# Patient Record
Sex: MALE | Race: OTHER | HISPANIC OR LATINO | URBAN - METROPOLITAN AREA
[De-identification: names, ages, dates, MRNs, and addresses within clinical notes are randomized per-mention and may not be internally consistent; named-entity substitution may affect disease eponyms.]

---

## 2019-12-17 ENCOUNTER — OUTPATIENT (OUTPATIENT)
Dept: OUTPATIENT SERVICES | Facility: HOSPITAL | Age: 59
LOS: 1 days | Discharge: HOME | End: 2019-12-17

## 2019-12-18 DIAGNOSIS — G47.33 OBSTRUCTIVE SLEEP APNEA (ADULT) (PEDIATRIC): ICD-10-CM

## 2020-09-26 ENCOUNTER — APPOINTMENT (OUTPATIENT)
Dept: DISASTER EMERGENCY | Facility: CLINIC | Age: 60
End: 2020-09-26

## 2020-09-26 PROBLEM — Z00.00 ENCOUNTER FOR PREVENTIVE HEALTH EXAMINATION: Status: ACTIVE | Noted: 2020-09-26

## 2020-09-27 LAB — SARS-COV-2 N GENE NPH QL NAA+PROBE: NOT DETECTED

## 2020-09-29 VITALS
OXYGEN SATURATION: 95 % | HEART RATE: 53 BPM | TEMPERATURE: 99 F | HEIGHT: 72 IN | DIASTOLIC BLOOD PRESSURE: 70 MMHG | RESPIRATION RATE: 18 BRPM | WEIGHT: 315 LBS | SYSTOLIC BLOOD PRESSURE: 150 MMHG

## 2020-09-29 RX ORDER — CHLORHEXIDINE GLUCONATE 213 G/1000ML
1 SOLUTION TOPICAL ONCE
Refills: 0 | Status: DISCONTINUED | OUTPATIENT
Start: 2020-09-30 | End: 2020-09-30

## 2020-09-29 NOTE — H&P ADULT - NSHPLABSRESULTS_GEN_ALL_CORE
12.1   4.85  )-----------( 318      ( 30 Sep 2020 11:11 )             36.5         PT/INR - ( 30 Sep 2020 11:11 )   PT: 15.9 sec;   INR: 1.34          PTT - ( 30 Sep 2020 11:11 )  PTT:42.0 sec          EKG: SB @ 51 with 1st degree AV block without significant ST/T segment changes

## 2020-09-29 NOTE — H&P ADULT - HISTORY OF PRESENT ILLNESS
*Meds reviewed, needs refill on Statin, has not taken Lipitor 10 mg as he has not had a refill    Cardiologist: Dr. Weber  Pharmacy: [facility] in [place] [address]  Escort: wife, Sade BETANCOURT: NEGATIVE, results in [facility]     Pt is a 60 y/o male with FHx of CAD, PMHx of HLD, newly diagnosed atrial fibrillation, (On Xarelto 20 mg last dose 09/29/2020 - Dr. Zeynep musa), presented to cardiologist Dr. Weber after being told he was in atrial fibrillation during his company physical back in 06/2020. Patient returned to Dr. Weber for follow-up where he underwent an NST that was abnormal. Patient denies any cardiac symptoms and reports feeling well for the past few months. Patient denies CP, SOB, palpitations, dizziness, diaphoresis, LE edema, orthopnea, fatigue, n/v, syncope, recent travel, or fatigue. NST 08/29/2020: LV global function normal. LV volume moderately enlarged. LV wall motion abnormal. Akinesis in the proximal inferoseptal segment. Hypokinesis in the proximal to distal inferior and septal segments. EF: 63%.  In light of patient's risk factors and abnormal NST, patient presents for left heart cardiac catheterization with possible intervention.       *Meds reviewed, needs refill on Statin, has not taken Lipitor 10 mg as he has not had a refill    Cardiologist: Dr. Weber  Pharmacy: [facility] in [place] [address]  Escort: wife, Sade BETANCOURT: NEGATIVE, results in [facility]     58 y/o male with FHx of CAD, PMHx of HLD, newly diagnosed atrial fibrillation, (On Xarelto 20 mg last dose 09/29/2020 - Dr. Adhikari aware), presented to Cardiologist Dr. Weber after being told he was in atrial fibrillation during his company physical back in 06/2020. Patient returned to Dr. Weber for follow-up where he underwent an NST that was abnormal. Patient denies any cardiac symptoms and reports feeling well for the past few months. Patient denies CP, SOB, palpitations, dizziness, diaphoresis, LE edema, orthopnea, fatigue, n/v, syncope, recent travel, or fatigue. NST 08/29/2020: LV global function normal. LV volume moderately enlarged. LV wall motion abnormal. Akinesis in the proximal inferoseptal segment. Hypokinesis in the proximal to distal inferior and septal segments. EF: 63%. In light of patient's risk factors, abnormal NST, recently dx AF, patient referred cardiac cath with possible intervention to r/o underlying CAD.      *Meds reviewed, needs refill on Statin, has not taken Lipitor 10 mg as he has not had a refill    Cardiologist: Dr. Weber  Pharmacy: [facility] in [place] [address]  Escort: wife, Sade BETANCOURT: NEGATIVE, results in [facility]     60 y/o male with FHx of CAD, PMHx of HLD, newly diagnosed atrial fibrillation, (On Xarelto 20 mg last dose 09/29/2020 - Dr. Adhikari aware), RAZIA (on CPAP at night) who presented to Cardiologist Dr. Weber after being told he was in atrial fibrillation during his company physical back in 06/2020. Patient returned to Dr. Weber for follow-up where he underwent an NST that was abnormal. Patient denies any cardiac symptoms and reports feeling well for the past few months. Patient denies CP, SOB, palpitations, dizziness, diaphoresis, LE edema, orthopnea, fatigue, n/v, syncope, recent travel, or fatigue. NST 08/29/2020: LV global function normal. LV volume moderately enlarged. LV wall motion abnormal. Akinesis in the proximal inferoseptal segment. Hypokinesis in the proximal to distal inferior and septal segments. EF: 63%. In light of patient's risk factors, abnormal NST, recently dx AF, patient referred cardiac cath with possible intervention to r/o underlying CAD.

## 2020-09-29 NOTE — H&P ADULT - ASSESSMENT
- Patient took Xarelto 20 mg on 09/29/2020 - Dr. Adhikari aware - OK   - Patient has not been taking his Atorvastatin 10 mg once daily as he ran out of refills  60 y/o male with FHx of CAD, PMHx of HLD, newly diagnosed atrial fibrillation, (On Xarelto 20 mg last dose 09/29/2020 - Dr. Zeynep musa), presented to Cardiologist Dr. Weber after being told he was in atrial fibrillation during his company physical back in 06/2020. Patient returned to Dr. Weber for follow-up where he underwent an NST that was abnormal. Patient denies any cardiac symptoms and reports feeling well for the past few months. Patient denies CP, SOB, palpitations, dizziness, diaphoresis, LE edema, orthopnea, fatigue, n/v, syncope, recent travel, or fatigue. NST 08/29/2020: LV global function normal. LV volume moderately enlarged. LV wall motion abnormal. Akinesis in the proximal inferoseptal segment. Hypokinesis in the proximal to distal inferior and septal segments. EF: 63%. In light of patient's risk factors, abnormal NST, recently dx AF, patient referred cardiac cath with possible intervention to r/o underlying CAD.     - Patient took Xarelto 20 mg on 09/29/2020 - Dr. Zeynep musa - OK   - Patient has not been taking his Atorvastatin 10 mg once daily as he ran out of refills  58 y/o male with FHx of CAD, PMHx of HLD, newly diagnosed atrial fibrillation, (On Xarelto 20 mg last dose 09/29/2020 - Dr. Adhikari aware), RAZIA (on CPAP at night) who presented to Cardiologist Dr. Weber after being told he was in atrial fibrillation during his company physical back in 06/2020. Patient returned to Dr. Weber for follow-up where he underwent an NST that was abnormal. Patient denies any cardiac symptoms and reports feeling well for the past few months. Patient denies CP, SOB, palpitations, dizziness, diaphoresis, LE edema, orthopnea, fatigue, n/v, syncope, recent travel, or fatigue. NST 08/29/2020: LV global function normal. LV volume moderately enlarged. LV wall motion abnormal. Akinesis in the proximal inferoseptal segment. Hypokinesis in the proximal to distal inferior and septal segments. EF: 63%. In light of patient's risk factors, abnormal NST, recently dx AF, patient referred cardiac cath with possible intervention to r/o underlying CAD.     Patient took Xarelto 20 mg on 09/29/2020 AM- Dr. Adhikari made aware and OK to proceed.     Patient has not been taking his Atorvastatin 10 mg once daily as he ran out of refills, needs refills upon discharge    h/o RAZIA and sleeps with CPAP, if he stays, need to order this    IV NS @ 75 cc/hr pre-cath fluids    Does not take Ecotrin/Plavix. Will load with Ecotrin 325 mg PO x1 and Plavix 600 mg PO x1 pre-caht.     ASA Class III    Mallampati Class III    Risks & benefits of procedure and alternative therapy have been explained to the patient including but not limited to: allergic reaction, bleeding with possible need for blood transfusion, infection, renal and vascular compromise, limb damage, arrhythmia, stroke, vessel dissection/perforation, Myocardial infarction, emergent CABG. Informed consent obtained and in chart.       Patient a candidate for sedation: Yes    Sedation Type: Moderate

## 2020-09-30 ENCOUNTER — OUTPATIENT (OUTPATIENT)
Dept: OUTPATIENT SERVICES | Facility: HOSPITAL | Age: 60
LOS: 1 days | Discharge: NURSING, REHAB OR LONG-TERM CARE | End: 2020-09-30
Payer: COMMERCIAL

## 2020-09-30 LAB
A1C WITH ESTIMATED AVERAGE GLUCOSE RESULT: 6.1 % — HIGH (ref 4–5.6)
ALBUMIN SERPL ELPH-MCNC: 4.6 G/DL — SIGNIFICANT CHANGE UP (ref 3.3–5)
ALP SERPL-CCNC: 55 U/L — SIGNIFICANT CHANGE UP (ref 40–120)
ALT FLD-CCNC: 41 U/L — SIGNIFICANT CHANGE UP (ref 10–45)
ANION GAP SERPL CALC-SCNC: 12 MMOL/L — SIGNIFICANT CHANGE UP (ref 5–17)
APTT BLD: 42 SEC — HIGH (ref 27.5–35.5)
AST SERPL-CCNC: 27 U/L — SIGNIFICANT CHANGE UP (ref 10–40)
BASOPHILS # BLD AUTO: 0.02 K/UL — SIGNIFICANT CHANGE UP (ref 0–0.2)
BASOPHILS NFR BLD AUTO: 0.4 % — SIGNIFICANT CHANGE UP (ref 0–2)
BILIRUB SERPL-MCNC: 0.4 MG/DL — SIGNIFICANT CHANGE UP (ref 0.2–1.2)
BUN SERPL-MCNC: 13 MG/DL — SIGNIFICANT CHANGE UP (ref 7–23)
CALCIUM SERPL-MCNC: 9.7 MG/DL — SIGNIFICANT CHANGE UP (ref 8.4–10.5)
CHLORIDE SERPL-SCNC: 104 MMOL/L — SIGNIFICANT CHANGE UP (ref 96–108)
CHOLEST SERPL-MCNC: 164 MG/DL — SIGNIFICANT CHANGE UP (ref 10–199)
CK MB CFR SERPL CALC: 1.7 NG/ML — SIGNIFICANT CHANGE UP (ref 0–6.7)
CK SERPL-CCNC: 235 U/L — HIGH (ref 30–200)
CO2 SERPL-SCNC: 24 MMOL/L — SIGNIFICANT CHANGE UP (ref 22–31)
CREAT SERPL-MCNC: 0.89 MG/DL — SIGNIFICANT CHANGE UP (ref 0.5–1.3)
EOSINOPHIL # BLD AUTO: 0.1 K/UL — SIGNIFICANT CHANGE UP (ref 0–0.5)
EOSINOPHIL NFR BLD AUTO: 2.1 % — SIGNIFICANT CHANGE UP (ref 0–6)
ESTIMATED AVERAGE GLUCOSE: 128 MG/DL — HIGH (ref 68–114)
GLUCOSE SERPL-MCNC: 94 MG/DL — SIGNIFICANT CHANGE UP (ref 70–99)
HCT VFR BLD CALC: 36.5 % — LOW (ref 39–50)
HDLC SERPL-MCNC: 43 MG/DL — SIGNIFICANT CHANGE UP
HGB BLD-MCNC: 12.1 G/DL — LOW (ref 13–17)
IMM GRANULOCYTES NFR BLD AUTO: 0.2 % — SIGNIFICANT CHANGE UP (ref 0–1.5)
INR BLD: 1.34 — HIGH (ref 0.88–1.16)
LIPID PNL WITH DIRECT LDL SERPL: 95 MG/DL — SIGNIFICANT CHANGE UP
LYMPHOCYTES # BLD AUTO: 1.34 K/UL — SIGNIFICANT CHANGE UP (ref 1–3.3)
LYMPHOCYTES # BLD AUTO: 27.6 % — SIGNIFICANT CHANGE UP (ref 13–44)
MCHC RBC-ENTMCNC: 32.4 PG — SIGNIFICANT CHANGE UP (ref 27–34)
MCHC RBC-ENTMCNC: 33.2 GM/DL — SIGNIFICANT CHANGE UP (ref 32–36)
MCV RBC AUTO: 97.9 FL — SIGNIFICANT CHANGE UP (ref 80–100)
MONOCYTES # BLD AUTO: 0.43 K/UL — SIGNIFICANT CHANGE UP (ref 0–0.9)
MONOCYTES NFR BLD AUTO: 8.9 % — SIGNIFICANT CHANGE UP (ref 2–14)
NEUTROPHILS # BLD AUTO: 2.95 K/UL — SIGNIFICANT CHANGE UP (ref 1.8–7.4)
NEUTROPHILS NFR BLD AUTO: 60.8 % — SIGNIFICANT CHANGE UP (ref 43–77)
NRBC # BLD: 0 /100 WBCS — SIGNIFICANT CHANGE UP (ref 0–0)
PLATELET # BLD AUTO: 318 K/UL — SIGNIFICANT CHANGE UP (ref 150–400)
POTASSIUM SERPL-MCNC: 4.1 MMOL/L — SIGNIFICANT CHANGE UP (ref 3.5–5.3)
POTASSIUM SERPL-SCNC: 4.1 MMOL/L — SIGNIFICANT CHANGE UP (ref 3.5–5.3)
PROT SERPL-MCNC: 7.5 G/DL — SIGNIFICANT CHANGE UP (ref 6–8.3)
PROTHROM AB SERPL-ACNC: 15.9 SEC — HIGH (ref 10.6–13.6)
RBC # BLD: 3.73 M/UL — LOW (ref 4.2–5.8)
RBC # FLD: 11.9 % — SIGNIFICANT CHANGE UP (ref 10.3–14.5)
SODIUM SERPL-SCNC: 140 MMOL/L — SIGNIFICANT CHANGE UP (ref 135–145)
TOTAL CHOLESTEROL/HDL RATIO MEASUREMENT: 3.8 RATIO — SIGNIFICANT CHANGE UP (ref 3.4–9.6)
TRIGL SERPL-MCNC: 128 MG/DL — SIGNIFICANT CHANGE UP (ref 10–149)
WBC # BLD: 4.85 K/UL — SIGNIFICANT CHANGE UP (ref 3.8–10.5)
WBC # FLD AUTO: 4.85 K/UL — SIGNIFICANT CHANGE UP (ref 3.8–10.5)

## 2020-09-30 PROCEDURE — 85730 THROMBOPLASTIN TIME PARTIAL: CPT

## 2020-09-30 PROCEDURE — 93458 L HRT ARTERY/VENTRICLE ANGIO: CPT | Mod: 26

## 2020-09-30 PROCEDURE — 93458 L HRT ARTERY/VENTRICLE ANGIO: CPT

## 2020-09-30 PROCEDURE — 93005 ELECTROCARDIOGRAM TRACING: CPT

## 2020-09-30 PROCEDURE — 80053 COMPREHEN METABOLIC PANEL: CPT

## 2020-09-30 PROCEDURE — C1887: CPT

## 2020-09-30 PROCEDURE — 85610 PROTHROMBIN TIME: CPT

## 2020-09-30 PROCEDURE — 82553 CREATINE MB FRACTION: CPT

## 2020-09-30 PROCEDURE — 83036 HEMOGLOBIN GLYCOSYLATED A1C: CPT

## 2020-09-30 PROCEDURE — 93010 ELECTROCARDIOGRAM REPORT: CPT

## 2020-09-30 PROCEDURE — 82550 ASSAY OF CK (CPK): CPT

## 2020-09-30 PROCEDURE — C1894: CPT

## 2020-09-30 PROCEDURE — 80061 LIPID PANEL: CPT

## 2020-09-30 PROCEDURE — 85025 COMPLETE CBC W/AUTO DIFF WBC: CPT

## 2020-09-30 PROCEDURE — 36415 COLL VENOUS BLD VENIPUNCTURE: CPT

## 2020-09-30 PROCEDURE — C1769: CPT

## 2020-09-30 RX ORDER — ASPIRIN/CALCIUM CARB/MAGNESIUM 324 MG
325 TABLET ORAL ONCE
Refills: 0 | Status: COMPLETED | OUTPATIENT
Start: 2020-09-30 | End: 2020-09-30

## 2020-09-30 RX ORDER — ATORVASTATIN CALCIUM 80 MG/1
1 TABLET, FILM COATED ORAL
Qty: 30 | Refills: 0
Start: 2020-09-30 | End: 2020-10-29

## 2020-09-30 RX ORDER — SODIUM CHLORIDE 9 MG/ML
500 INJECTION INTRAMUSCULAR; INTRAVENOUS; SUBCUTANEOUS
Refills: 0 | Status: DISCONTINUED | OUTPATIENT
Start: 2020-09-30 | End: 2020-09-30

## 2020-09-30 RX ORDER — ATORVASTATIN CALCIUM 80 MG/1
1 TABLET, FILM COATED ORAL
Qty: 0 | Refills: 0 | DISCHARGE

## 2020-09-30 RX ORDER — CLOPIDOGREL BISULFATE 75 MG/1
600 TABLET, FILM COATED ORAL ONCE
Refills: 0 | Status: COMPLETED | OUTPATIENT
Start: 2020-09-30 | End: 2020-09-30

## 2020-09-30 RX ADMIN — CLOPIDOGREL BISULFATE 600 MILLIGRAM(S): 75 TABLET, FILM COATED ORAL at 12:09

## 2020-09-30 RX ADMIN — SODIUM CHLORIDE 75 MILLILITER(S): 9 INJECTION INTRAMUSCULAR; INTRAVENOUS; SUBCUTANEOUS at 12:09

## 2020-09-30 RX ADMIN — Medication 325 MILLIGRAM(S): at 12:09

## 2020-09-30 NOTE — PROGRESS NOTE ADULT - SUBJECTIVE AND OBJECTIVE BOX
Interventional Cardiology PA SDA Discharge Note    Patient without complaints. Ambulated and voided without difficulties    Afebrile, VSS    Ext: Right Radial: No hematoma, no bleeding, dressing; C/D/I      Pulses: 2+  intact RAD to baseline     A/P:  58 y/o male with FHx of CAD, PMHx of HLD, newly diagnosed atrial fibrillation, (On Xarelto 20 mg last dose 09/29/2020 - Dr. Adhikari aware), RAZIA (on CPAP at night) who presented to Cardiologist Dr. Weber after being told he was in atrial fibrillation during his company physical back in 06/2020. Patient returned to Dr. Weber for follow-up where he underwent an NST that was abnormal. Patient denies any cardiac symptoms and reports feeling well for the past few months. Patient denies CP, SOB, palpitations, dizziness, diaphoresis, LE edema, orthopnea, fatigue, n/v, syncope, recent travel, or fatigue. NST 08/29/2020: LV global function normal. LV volume moderately enlarged. LV wall motion abnormal. Akinesis in the proximal inferoseptal segment. Hypokinesis in the proximal to distal inferior and septal segments. EF: 63%. In light of patient's risk factors, abnormal NST, recently dx AF, patient referred cardiac cath with possible intervention to r/o underlying CAD.   Pt is now s/p dx cardiac cath on 9/30/20:      1.	Stable for discharge as per attending Dr. Adhikari after bed rest, pt voids, wrist stable and 30 minutes of ambulation.  2.	Follow-up with PMD/Cardiologist Dr. Weber in 1-2 weeks  3.	Discharged forms signed and copies in chart    Interventional Cardiology PA SDA Discharge Note    Patient without complaints. Ambulated and voided without difficulties    Afebrile, VSS    Ext: Right Radial: No hematoma, no bleeding, dressing; C/D/I      Pulses: 2+  intact RAD to baseline     A/P:  58 y/o male with FHx of CAD, PMHx of HLD, newly diagnosed atrial fibrillation, (On Xarelto 20 mg last dose 09/29/2020 - Dr. Adhikari aware), RAZIA (on CPAP at night) who presented to Cardiologist Dr. Weber after being told he was in atrial fibrillation during his company physical back in 06/2020. Patient returned to Dr. Weber for follow-up where he underwent an NST that was abnormal. Patient denies any cardiac symptoms and reports feeling well for the past few months. Patient denies CP, SOB, palpitations, dizziness, diaphoresis, LE edema, orthopnea, fatigue, n/v, syncope, recent travel, or fatigue. NST 08/29/2020: LV global function normal. LV volume moderately enlarged. LV wall motion abnormal. Akinesis in the proximal inferoseptal segment. Hypokinesis in the proximal to distal inferior and septal segments. EF: 63%. In light of patient's risk factors, abnormal NST, recently dx AF, patient referred cardiac cath with possible intervention to r/o underlying CAD.   Pt is now s/p dx cardiac cath on 9/30/20: normal coronaries, no AS/MR, EDP 18.    1.	Stable for discharge as per attending Dr. Adhikari after bed rest, pt voids, wrist stable and 30 minutes of ambulation.  2.	Follow-up with PMD/Cardiologist Dr. Weber in 1-2 weeks  3.	Discharged forms signed and copies in chart

## 2020-12-30 PROBLEM — Z86.79 PERSONAL HISTORY OF OTHER DISEASES OF THE CIRCULATORY SYSTEM: Chronic | Status: ACTIVE | Noted: 2020-09-29

## 2020-12-30 PROBLEM — E78.5 HYPERLIPIDEMIA, UNSPECIFIED: Chronic | Status: ACTIVE | Noted: 2020-09-29

## 2021-04-09 ENCOUNTER — APPOINTMENT (OUTPATIENT)
Dept: CARDIOLOGY | Facility: CLINIC | Age: 61
End: 2021-04-09
Payer: COMMERCIAL

## 2021-04-09 VITALS
TEMPERATURE: 97 F | BODY MASS INDEX: 42.66 KG/M2 | DIASTOLIC BLOOD PRESSURE: 78 MMHG | WEIGHT: 315 LBS | SYSTOLIC BLOOD PRESSURE: 146 MMHG | HEIGHT: 72 IN | HEART RATE: 50 BPM

## 2021-04-09 DIAGNOSIS — Z78.9 OTHER SPECIFIED HEALTH STATUS: ICD-10-CM

## 2021-04-09 PROCEDURE — 93000 ELECTROCARDIOGRAM COMPLETE: CPT

## 2021-04-09 PROCEDURE — 99072 ADDL SUPL MATRL&STAF TM PHE: CPT

## 2021-04-09 PROCEDURE — 99204 OFFICE O/P NEW MOD 45 MIN: CPT

## 2021-04-09 NOTE — HISTORY OF PRESENT ILLNESS
[FreeTextEntry1] : The patient has history of RAZIA . He had Atrial fibrillation . Needed DCCV . Had work up which included a cardiac cath which showed mild lumen irregularities . The patient uses Bipap every night . No SOB or chest pain . Echo from 7-20 showed EF 45-50% . He has been stable on Metoprolol

## 2021-04-09 NOTE — PHYSICAL EXAM
[General Appearance - Well Developed] : well developed [Normal Appearance] : normal appearance [Well Groomed] : well groomed [General Appearance - Well Nourished] : well nourished [No Deformities] : no deformities [General Appearance - In No Acute Distress] : no acute distress [Normal Conjunctiva] : the conjunctiva exhibited no abnormalities [Eyelids - No Xanthelasma] : the eyelids demonstrated no xanthelasmas [Normal Oral Mucosa] : normal oral mucosa [No Oral Pallor] : no oral pallor [No Oral Cyanosis] : no oral cyanosis [Normal Jugular Venous A Waves Present] : normal jugular venous A waves present [Normal Jugular Venous V Waves Present] : normal jugular venous V waves present [No Jugular Venous Prado A Waves] : no jugular venous prado A waves [Normal Rate] : normal [Rhythm Regular] : regular [No Murmur] : no murmurs heard [2+] : left 2+ [No Pitting Edema] : no pitting edema present [Respiration, Rhythm And Depth] : normal respiratory rhythm and effort [Exaggerated Use Of Accessory Muscles For Inspiration] : no accessory muscle use [Auscultation Breath Sounds / Voice Sounds] : lungs were clear to auscultation bilaterally [Abdomen Soft] : soft [Abdomen Tenderness] : non-tender [Abdomen Mass (___ Cm)] : no abdominal mass palpated [Abnormal Walk] : normal gait [Nail Clubbing] : no clubbing of the fingernails [Cyanosis, Localized] : no localized cyanosis [Petechial Hemorrhages (___cm)] : no petechial hemorrhages [Skin Color & Pigmentation] : normal skin color and pigmentation [] : no rash [No Venous Stasis] : no venous stasis [Skin Lesions] : no skin lesions [No Skin Ulcers] : no skin ulcer [No Xanthoma] : no  xanthoma was observed [FreeTextEntry1] : No Bruit

## 2021-04-09 NOTE — ASSESSMENT
[FreeTextEntry1] : The patient had PAF last year and had an extensive work up done by cardiologist in [place] .He had an EF of 45-50% but had no CAD ( possibly from AF )  . He had PAF and had DCCV . No known history of HTN and he remains CAHDSvasc 0 and is now off of DOAC . The patient has no CP or SOB and has remained in NSR . The patient has had no recurrence of AF . He  is obese and has RAZIA . From the cardiac point of view he can continue to work as long as the physicians at [facility] approve of his medication and his treatment  of RAZIA

## 2021-04-14 ENCOUNTER — APPOINTMENT (OUTPATIENT)
Dept: CARDIOLOGY | Facility: CLINIC | Age: 61
End: 2021-04-14

## 2021-04-28 ENCOUNTER — APPOINTMENT (OUTPATIENT)
Dept: CARDIOLOGY | Facility: CLINIC | Age: 61
End: 2021-04-28
Payer: COMMERCIAL

## 2021-04-28 PROCEDURE — 99072 ADDL SUPL MATRL&STAF TM PHE: CPT

## 2021-04-28 PROCEDURE — 93306 TTE W/DOPPLER COMPLETE: CPT

## 2021-06-30 ENCOUNTER — APPOINTMENT (OUTPATIENT)
Dept: PULMONOLOGY | Facility: CLINIC | Age: 61
End: 2021-06-30
Payer: COMMERCIAL

## 2021-06-30 VITALS
HEART RATE: 80 BPM | RESPIRATION RATE: 14 BRPM | SYSTOLIC BLOOD PRESSURE: 126 MMHG | WEIGHT: 315 LBS | DIASTOLIC BLOOD PRESSURE: 78 MMHG | OXYGEN SATURATION: 98 % | BODY MASS INDEX: 42.66 KG/M2 | HEIGHT: 72 IN

## 2021-06-30 PROCEDURE — 99213 OFFICE O/P EST LOW 20 MIN: CPT

## 2021-06-30 NOTE — PROCEDURE
[FreeTextEntry1] : RAZIA COMPLIANT AND BENEFITING, SPOKE REGARDING RECALL WELL AWARE\par WEIGHT LOSS\par CARDIO NOTE REVIEWED

## 2021-08-25 ENCOUNTER — APPOINTMENT (OUTPATIENT)
Dept: CARDIOLOGY | Facility: CLINIC | Age: 61
End: 2021-08-25
Payer: COMMERCIAL

## 2021-08-25 VITALS
DIASTOLIC BLOOD PRESSURE: 88 MMHG | SYSTOLIC BLOOD PRESSURE: 126 MMHG | TEMPERATURE: 97.3 F | BODY MASS INDEX: 42.66 KG/M2 | HEART RATE: 76 BPM | WEIGHT: 315 LBS | HEIGHT: 72 IN

## 2021-08-25 VITALS — DIASTOLIC BLOOD PRESSURE: 70 MMHG | SYSTOLIC BLOOD PRESSURE: 104 MMHG

## 2021-08-25 PROCEDURE — 93000 ELECTROCARDIOGRAM COMPLETE: CPT

## 2021-08-25 PROCEDURE — 99214 OFFICE O/P EST MOD 30 MIN: CPT

## 2021-08-25 NOTE — CARDIOLOGY SUMMARY
[___] : [unfilled] [de-identified] : 8- AF controlled VR .  [de-identified] : 4- Normal V systolic functio mild MR mild TR .

## 2021-08-25 NOTE — ASSESSMENT
[FreeTextEntry1] : The patient has PAF and is back in AF. The patient has had no chest pain , No SOB  He has RAZIA and is obese . He is using BIPAP and has seen pulmonary . He is CHADSvasc 0 . Repeat echo done in April shows normal LV systolic function which is improved .

## 2021-08-25 NOTE — HISTORY OF PRESENT ILLNESS
[FreeTextEntry1] : The patient has been feeling well. Noted again to be in AF . The patient had a repeat echo here which showed normal LV systolic function .LA enlargement was mild by dimension and increased LA volume index .

## 2021-08-25 NOTE — REASON FOR VISIT
[Arrhythmia/ECG Abnorrmalities] : arrhythmia/ECG abnormalities [Consultation] : a consultation regarding [Atrial Fibrillation] : atrial fibrillation [Cardiomyopathy] : cardiomyopathy [FreeTextEntry3] : Dr. De Luna

## 2021-09-02 ENCOUNTER — APPOINTMENT (OUTPATIENT)
Dept: CARDIOLOGY | Facility: CLINIC | Age: 61
End: 2021-09-02
Payer: COMMERCIAL

## 2021-09-02 VITALS
HEIGHT: 72 IN | SYSTOLIC BLOOD PRESSURE: 124 MMHG | WEIGHT: 315 LBS | HEART RATE: 65 BPM | BODY MASS INDEX: 42.66 KG/M2 | OXYGEN SATURATION: 95 % | RESPIRATION RATE: 16 BRPM | DIASTOLIC BLOOD PRESSURE: 74 MMHG | TEMPERATURE: 98 F

## 2021-09-02 PROCEDURE — 99205 OFFICE O/P NEW HI 60 MIN: CPT

## 2021-09-02 PROCEDURE — 93000 ELECTROCARDIOGRAM COMPLETE: CPT

## 2021-09-03 NOTE — PHYSICAL EXAM
[Well Developed] : well developed [Well Nourished] : well nourished [No Acute Distress] : no acute distress [Normal Conjunctiva] : normal conjunctiva [Normal Venous Pressure] : normal venous pressure [No Carotid Bruit] : no carotid bruit [No Murmur] : no murmur [No Rub] : no rub [No Gallop] : no gallop [Irregularly Irregular] : irregularly irregular [Clear Lung Fields] : clear lung fields [Good Air Entry] : good air entry [No Respiratory Distress] : no respiratory distress  [Soft] : abdomen soft [Non Tender] : non-tender [No Masses/organomegaly] : no masses/organomegaly [Normal Bowel Sounds] : normal bowel sounds [Normal Gait] : normal gait [No Edema] : no edema [No Cyanosis] : no cyanosis [No Clubbing] : no clubbing [No Varicosities] : no varicosities [No Rash] : no rash [No Skin Lesions] : no skin lesions [Moves all extremities] : moves all extremities [No Focal Deficits] : no focal deficits [Normal Speech] : normal speech [Alert and Oriented] : alert and oriented [Normal memory] : normal memory

## 2021-09-05 NOTE — ASSESSMENT
[FreeTextEntry1] : ## Persistent AF s/p DCCV\par ## Non-ischemic CM\par \par - CHADSVASC: 0. Not on OAC. Discussed pros-cons. After discussing pros-cons, we will hold off on OAC for now. To be started on Xarelto after AF ablation (see below).\par - Discussed management options including clinical observation, rate control, rhythm control with AAD/DCCV vs ablation. Considering prior dilated CM, they opted for rhythm control- with ablation, which is reasonable over AAD considering his young age.\par - We discussed multiple therapeutic options for the treatment of atrial fibrillation, including undergoing an atrial fibrillation/left atrial antral isolation ablation. The details of the procedure and risks associated with undergoing an atrial fibrillation/SHANTANU ablation were discussed in detail including, but not limited to, death, myocardial ischemia, stroke, cardiac perforation, pulmonary vein stenosis, diaphragmatic paralysis via phrenic nerve injury, catheter entrapment in the mitral valve or other location, bleeding, infection, deep vein thrombosis, vascular injury, and worsening atrial arrhythmias. We also discussed that there is a low risk of possible esophageal ulceration, atrial esophageal fistula, atrial bronchial fistula, or another complication requiring the need for major surgery to address.\par \par We also discussed that recurrent atrial fibrillation in the first 2-3 months post procedure is a part of the healing process and has no impact on the overall longer- term success of the ablation. To try to reduce the incidence of these events, the plan will be for antiarrhythmic therapy to be restarted post procedure and continued for the first 3 months after ablation. 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

## 2021-09-05 NOTE — HISTORY OF PRESENT ILLNESS
[FreeTextEntry1] : I had a pleasure of seeing Mr. WEBER for initial consultation for Atrial Fibrillation.  He is accompanied by his wife. he works at [employer] as a . \par \par Mr. WEBER is a 60 year-year old male with history of obesity, persistent atrial fibrillation s/p DCCV (07/21), Non-ischemic CM with improvement of EF after DCCV, RAZIA on CPAP- compliant is found to have AF again.\par \par ? Fatigue\par Denies chest pain, shortness of breath, palpitation, dizziness or LOC except noted above.\par \par EKG: AF @ 67\par TTE (04/21): Nl EF, Mild LAE\par LHC (10/20): Mild irreg\par Cardio: Dr. Wesley

## 2021-09-08 ENCOUNTER — TRANSCRIPTION ENCOUNTER (OUTPATIENT)
Age: 61
End: 2021-09-08

## 2021-09-13 ENCOUNTER — OUTPATIENT (OUTPATIENT)
Dept: OUTPATIENT SERVICES | Facility: HOSPITAL | Age: 61
LOS: 1 days | Discharge: HOME | End: 2021-09-13
Payer: COMMERCIAL

## 2021-09-13 VITALS
HEIGHT: 73 IN | TEMPERATURE: 98 F | HEART RATE: 66 BPM | RESPIRATION RATE: 14 BRPM | SYSTOLIC BLOOD PRESSURE: 136 MMHG | OXYGEN SATURATION: 97 % | WEIGHT: 315 LBS | DIASTOLIC BLOOD PRESSURE: 78 MMHG

## 2021-09-13 DIAGNOSIS — Z01.818 ENCOUNTER FOR OTHER PREPROCEDURAL EXAMINATION: ICD-10-CM

## 2021-09-13 DIAGNOSIS — I48.0 PAROXYSMAL ATRIAL FIBRILLATION: ICD-10-CM

## 2021-09-13 LAB
ALBUMIN SERPL ELPH-MCNC: 4.6 G/DL — SIGNIFICANT CHANGE UP (ref 3.5–5.2)
ALP SERPL-CCNC: 56 U/L — SIGNIFICANT CHANGE UP (ref 30–115)
ALT FLD-CCNC: 23 U/L — SIGNIFICANT CHANGE UP (ref 0–41)
ANION GAP SERPL CALC-SCNC: 12 MMOL/L — SIGNIFICANT CHANGE UP (ref 7–14)
APTT BLD: 38.3 SEC — SIGNIFICANT CHANGE UP (ref 27–39.2)
AST SERPL-CCNC: 21 U/L — SIGNIFICANT CHANGE UP (ref 0–41)
BASOPHILS # BLD AUTO: 0.02 K/UL — SIGNIFICANT CHANGE UP (ref 0–0.2)
BASOPHILS NFR BLD AUTO: 0.3 % — SIGNIFICANT CHANGE UP (ref 0–1)
BILIRUB SERPL-MCNC: 0.3 MG/DL — SIGNIFICANT CHANGE UP (ref 0.2–1.2)
BUN SERPL-MCNC: 17 MG/DL — SIGNIFICANT CHANGE UP (ref 10–20)
CALCIUM SERPL-MCNC: 9.2 MG/DL — SIGNIFICANT CHANGE UP (ref 8.5–10.1)
CHLORIDE SERPL-SCNC: 104 MMOL/L — SIGNIFICANT CHANGE UP (ref 98–110)
CO2 SERPL-SCNC: 23 MMOL/L — SIGNIFICANT CHANGE UP (ref 17–32)
CREAT SERPL-MCNC: 0.9 MG/DL — SIGNIFICANT CHANGE UP (ref 0.7–1.5)
EOSINOPHIL # BLD AUTO: 0.12 K/UL — SIGNIFICANT CHANGE UP (ref 0–0.7)
EOSINOPHIL NFR BLD AUTO: 1.8 % — SIGNIFICANT CHANGE UP (ref 0–8)
GLUCOSE SERPL-MCNC: 88 MG/DL — SIGNIFICANT CHANGE UP (ref 70–99)
HCT VFR BLD CALC: 37.5 % — LOW (ref 42–52)
HGB BLD-MCNC: 12.7 G/DL — LOW (ref 14–18)
IMM GRANULOCYTES NFR BLD AUTO: 0.3 % — SIGNIFICANT CHANGE UP (ref 0.1–0.3)
INR BLD: 1.07 RATIO — SIGNIFICANT CHANGE UP (ref 0.65–1.3)
LYMPHOCYTES # BLD AUTO: 2.18 K/UL — SIGNIFICANT CHANGE UP (ref 1.2–3.4)
LYMPHOCYTES # BLD AUTO: 33.1 % — SIGNIFICANT CHANGE UP (ref 20.5–51.1)
MCHC RBC-ENTMCNC: 32.6 PG — HIGH (ref 27–31)
MCHC RBC-ENTMCNC: 33.9 G/DL — SIGNIFICANT CHANGE UP (ref 32–37)
MCV RBC AUTO: 96.4 FL — HIGH (ref 80–94)
MONOCYTES # BLD AUTO: 0.66 K/UL — HIGH (ref 0.1–0.6)
MONOCYTES NFR BLD AUTO: 10 % — HIGH (ref 1.7–9.3)
NEUTROPHILS # BLD AUTO: 3.58 K/UL — SIGNIFICANT CHANGE UP (ref 1.4–6.5)
NEUTROPHILS NFR BLD AUTO: 54.5 % — SIGNIFICANT CHANGE UP (ref 42.2–75.2)
NRBC # BLD: 0 /100 WBCS — SIGNIFICANT CHANGE UP (ref 0–0)
PLATELET # BLD AUTO: 388 K/UL — SIGNIFICANT CHANGE UP (ref 130–400)
POTASSIUM SERPL-MCNC: 4.1 MMOL/L — SIGNIFICANT CHANGE UP (ref 3.5–5)
POTASSIUM SERPL-SCNC: 4.1 MMOL/L — SIGNIFICANT CHANGE UP (ref 3.5–5)
PROT SERPL-MCNC: 7.5 G/DL — SIGNIFICANT CHANGE UP (ref 6–8)
PROTHROM AB SERPL-ACNC: 12.3 SEC — SIGNIFICANT CHANGE UP (ref 9.95–12.87)
RBC # BLD: 3.89 M/UL — LOW (ref 4.7–6.1)
RBC # FLD: 12.1 % — SIGNIFICANT CHANGE UP (ref 11.5–14.5)
SODIUM SERPL-SCNC: 139 MMOL/L — SIGNIFICANT CHANGE UP (ref 135–146)
WBC # BLD: 6.58 K/UL — SIGNIFICANT CHANGE UP (ref 4.8–10.8)
WBC # FLD AUTO: 6.58 K/UL — SIGNIFICANT CHANGE UP (ref 4.8–10.8)

## 2021-09-13 PROCEDURE — 93010 ELECTROCARDIOGRAM REPORT: CPT

## 2021-09-13 NOTE — H&P PST ADULT - NSICDXPASTMEDICALHX_GEN_ALL_CORE_FT
PAST MEDICAL HISTORY:  H/O hernia repair     History of atrial fibrillation     Hyperlipidemia     RAZIA treated with BiPAP     Severe obesity (BMI >= 40)     Torn meniscus RIGHT KNEE REPAIR

## 2021-09-13 NOTE — H&P PST ADULT - HISTORY OF PRESENT ILLNESS
PT PRESENTS TO PAST WITH NO SOB, CP, PALPITATIONS, DYSURIA, UTI OR URI AT PRESENT.   PT ABLE TO WALK UP 2-3 FLIGHTS OF STEPS WITH NO SOB.  AS PER THE PT, THIS IS HIS/HER COMPLETE MEDICAL AND SURGICAL HX, INCLUDING MEDICATIONS PRESCRIBED AND OVER THE COUNTER  pt denies any covid s/s, YES   tested positive in the past- 3/2020- PT AWARE HE IS SCHEDULED FOR COVID TESTING PRIOR TO PROCEDURE   pt advised self quarantine till day of procedure  denies travel outside the [place] in the past 30 days  Anesthesia Alert  NO--Difficult Airway  NO--History of neck surgery or radiation  NO--Limited ROM of neck  NO--History of Malignant hyperthermia  NO--Personal or family history of Pseudocholinesterase deficiency  NO--Prior Anesthesia Complication  NO--Latex Allergy  NO--Loose teeth  NO--History of Rheumatoid Arthritis  YES --RAZIA  NO BLEEDING RISK  NO--Other_____

## 2021-09-13 NOTE — H&P PST ADULT - REASON FOR ADMISSION
Patient is a   60 year old    male presenting to PAST in preparation for  JEROD/ A- FIB ABLATION /MAPPING/ EP STUDY A -FIB. on    9/27/21  under   GEN anesthesia by Dr. Swift .  Pt reports-- I have a h/o a fib. I have it for over a year.   Pt denies having any pain.

## 2021-09-14 ENCOUNTER — RESULT REVIEW (OUTPATIENT)
Age: 61
End: 2021-09-14

## 2021-09-14 PROCEDURE — 71046 X-RAY EXAM CHEST 2 VIEWS: CPT | Mod: 26

## 2021-09-24 ENCOUNTER — OUTPATIENT (OUTPATIENT)
Dept: OUTPATIENT SERVICES | Facility: HOSPITAL | Age: 61
LOS: 1 days | Discharge: HOME | End: 2021-09-24

## 2021-09-24 DIAGNOSIS — Z11.59 ENCOUNTER FOR SCREENING FOR OTHER VIRAL DISEASES: ICD-10-CM

## 2021-09-25 PROBLEM — G47.33 OBSTRUCTIVE SLEEP APNEA (ADULT) (PEDIATRIC): Chronic | Status: ACTIVE | Noted: 2021-09-13

## 2021-09-25 PROBLEM — E66.01 MORBID (SEVERE) OBESITY DUE TO EXCESS CALORIES: Chronic | Status: ACTIVE | Noted: 2021-09-13

## 2021-09-25 PROBLEM — Z98.890 OTHER SPECIFIED POSTPROCEDURAL STATES: Chronic | Status: ACTIVE | Noted: 2021-09-13

## 2021-09-25 PROBLEM — S83.209A UNSPECIFIED TEAR OF UNSPECIFIED MENISCUS, CURRENT INJURY, UNSPECIFIED KNEE, INITIAL ENCOUNTER: Chronic | Status: ACTIVE | Noted: 2021-09-13

## 2021-09-27 ENCOUNTER — INPATIENT (INPATIENT)
Facility: HOSPITAL | Age: 61
LOS: 0 days | Discharge: HOME | End: 2021-09-28
Attending: INTERNAL MEDICINE | Admitting: INTERNAL MEDICINE
Payer: COMMERCIAL

## 2021-09-27 VITALS — HEIGHT: 72.83 IN | WEIGHT: 315 LBS

## 2021-09-27 DIAGNOSIS — I48.0 PAROXYSMAL ATRIAL FIBRILLATION: ICD-10-CM

## 2021-09-27 PROCEDURE — 93325 DOPPLER ECHO COLOR FLOW MAPG: CPT | Mod: 26

## 2021-09-27 PROCEDURE — 93662 INTRACARDIAC ECG (ICE): CPT | Mod: 26

## 2021-09-27 PROCEDURE — 93656 COMPRE EP EVAL ABLTJ ATR FIB: CPT

## 2021-09-27 PROCEDURE — 76937 US GUIDE VASCULAR ACCESS: CPT | Mod: 26

## 2021-09-27 PROCEDURE — 93312 ECHO TRANSESOPHAGEAL: CPT | Mod: 26

## 2021-09-27 PROCEDURE — 93320 DOPPLER ECHO COMPLETE: CPT | Mod: 26

## 2021-09-27 PROCEDURE — 93613 INTRACARDIAC EPHYS 3D MAPG: CPT

## 2021-09-27 PROCEDURE — 93623 PRGRMD STIMJ&PACG IV RX NFS: CPT | Mod: 26

## 2021-09-27 RX ORDER — SODIUM CHLORIDE 9 MG/ML
1000 INJECTION, SOLUTION INTRAVENOUS
Refills: 0 | Status: DISCONTINUED | OUTPATIENT
Start: 2021-09-27 | End: 2021-09-27

## 2021-09-27 RX ORDER — METOPROLOL TARTRATE 50 MG
1 TABLET ORAL
Qty: 0 | Refills: 0 | DISCHARGE

## 2021-09-27 RX ORDER — RIVAROXABAN 15 MG-20MG
20 KIT ORAL DAILY
Refills: 0 | Status: DISCONTINUED | OUTPATIENT
Start: 2021-09-27 | End: 2021-09-27

## 2021-09-27 RX ORDER — APIXABAN 2.5 MG/1
5 TABLET, FILM COATED ORAL EVERY 12 HOURS
Refills: 0 | Status: DISCONTINUED | OUTPATIENT
Start: 2021-09-27 | End: 2021-09-28

## 2021-09-27 RX ORDER — ATORVASTATIN CALCIUM 80 MG/1
10 TABLET, FILM COATED ORAL DAILY
Refills: 0 | Status: DISCONTINUED | OUTPATIENT
Start: 2021-09-27 | End: 2021-09-28

## 2021-09-27 RX ORDER — METOPROLOL TARTRATE 50 MG
50 TABLET ORAL DAILY
Refills: 0 | Status: DISCONTINUED | OUTPATIENT
Start: 2021-09-27 | End: 2021-09-28

## 2021-09-27 RX ORDER — APIXABAN 2.5 MG/1
1 TABLET, FILM COATED ORAL
Qty: 60 | Refills: 2
Start: 2021-09-27 | End: 2021-12-25

## 2021-09-27 RX ORDER — METOPROLOL TARTRATE 50 MG
25 TABLET ORAL DAILY
Refills: 0 | Status: DISCONTINUED | OUTPATIENT
Start: 2021-09-27 | End: 2021-09-28

## 2021-09-27 RX ORDER — ACETAMINOPHEN 500 MG
650 TABLET ORAL ONCE
Refills: 0 | Status: DISCONTINUED | OUTPATIENT
Start: 2021-09-27 | End: 2021-09-28

## 2021-09-27 RX ORDER — OXYCODONE HYDROCHLORIDE 5 MG/1
5 TABLET ORAL ONCE
Refills: 0 | Status: DISCONTINUED | OUTPATIENT
Start: 2021-09-27 | End: 2021-09-28

## 2021-09-27 RX ORDER — RIVAROXABAN 15 MG-20MG
1 KIT ORAL
Qty: 0 | Refills: 0 | DISCHARGE

## 2021-09-27 RX ADMIN — APIXABAN 5 MILLIGRAM(S): 2.5 TABLET, FILM COATED ORAL at 18:49

## 2021-09-27 NOTE — CHART NOTE - NSCHARTNOTEFT_GEN_A_CORE
PACU ANESTHESIA ADMISSION NOTE      Procedure: afib ablation  Post op diagnosis:  afub    ____  Intubated  TV:______       Rate: ______      FiO2: ______    __x__  Patent Airway    __x__  Full return of protective reflexes    __x__  Full recovery from anesthesia / back to baseline status    Vitals:  T(C): --  HR: --  BP: --  RR: --  SpO2: --    Mental Status:  __x__ Awake   ___x__ Alert   _____ Drowsy   _____ Sedated    Nausea/Vomiting:  __x__ NO  ______Yes,   See Post - Op Orders          Pain Scale (0-10):  __0___    Treatment: ____ None    __x__ See Post - Op/PCA Orders    Post - Operative Fluids:   ____ Oral   __x__ See Post - Op Orders    Plan: Discharge:   ____Home       __x___Floor     _____Critical Care    _____  Other:_________________    Comments: Patient had smooth intraoperative event, no anesthesia complication.    PACU Vital signs: HR:     84       BP:   115     /     66     RR:      14       O2 Sat:  97     %     Temp 98.1
PROCEDURES:   •	AFIB Ablation by Pulmonary Vein Isolation (PVI)  •	EP study with CS catheter placement and pacing  •	Single transseptal puncture with LA entry  •	Intracardiac echocardiography  •	Ultrasound for venous access  •	Three-dimensional intracardiac electro-anatomic mapping   •	Acute drug testing/IV Drug-Isuprel Administration        •	External direct current cardioversion                                      BRIEF SUMMARY:  AF on presentation. Trace baseline pericardial effusion. 1st pass isolation PVI. DCCV to sinus. No recurrence with Isuprel and aggressive stimulation. Vascade in left groin, figure of 8 in right groin. Unchanged trivial effusion at the end.    : Kalia Veras MD, [facility], [facility]     INDICATION FOR PROCEDURE: Symptomatic early persistent atrial fibrillation intolerant/refractory to anti-arrhythmic medication     JEROD was done prior to the procedure showed left atrial enlargement with no left atrial thrombus. Please see separate report for detailed findings.     CONSENT: The risks, benefits and alternatives to the procedure have been described to the patient in detail. All questions were answered.  The patient expressed understanding and has agreed to proceed.      PRESENTING RHYTHM: Atrial Fibrillation    DETAILS OF PROCEDURE: The patient was brought to the EP Lab in a fasting state after informed and written consent was obtained. Cardiac rhythm, blood pressure, heart rate, respirations, oxygen saturation and level of consciousness were monitored prior to, throughout and after the procedure. General anesthesia was administered by trained staff members with the supervision of an anesthesiology attending. The patient was placed supine on the fluoroscopy table, prepped and draped in the usual sterile fashion.  Local anesthetic was provided. Ultrasound was utilized to confirm femoral venous patency. Needle access was obtained under ultrasound guidance and a permanent recording obtained.  Venous access was obtained using a micropuncture needle in the right/left femoral veins under fluoroscopic/anatomic and ultrasound guidance using the modified Seldinger technique.  Guidewires were placed through each venous access. One 8.5 French SL-1 sheaths in right femoral vein, and one 11 French and a 6 French short sheaths in left femoral vein.     INTRACARDIAC ECHO:  Intracardiac echocardiography was performed under the guidance of fluoroscopy from the mid right atrium. Cardiac anatomy was assessed. There was trace baseline pericardial effusion. The left atrial size was enlarged. No thrombus was seen.  Post-procedure, the intracardiac echocardiogram was repeated and there remained unchanged.    CATHETER PLACEMENT: The intracardiac echo catheter was advanced via the femoral vein to the mid-right atrium. A [employer]y catheter and the [employer] SmartTouch DF curve catheter were advanced via long sheaths into the left atrium after transseptal puncture.  A deflectable decapolar catheter was advanced in the CS.     SINGLE TRANSSEPTAL PUNCTURE WITH LA ENTRY: We used the RF transseptal needle under the guidance of fluoroscopic images and intracardiac echo. The intra-atrial septum was punctured and left atrial access obtained. Anticoagulation with Heparin IV bolus was immediately provided after puncture. We titrated the heparin drip per protocol to keep ACT greater than 350 throughout the left atrial ablation. ACT were checked every 15-20 minutes and adjusted per protocol. ICE imaging confirmed LA entry.     THREE DIMENSIONAL ELECTRO-ANATOMIC MAPPING: Using the mapping catheter and CARTO system, 3-D electroanatomic mapping of the left atrium and pulmonary veins were created.     PULMONARY VENOUS ISOLATION: Pulmonary vein potentials were identified at the antra of each vein. We performed a wide area circumferential ablation around the pulmonary veins to create electrical isolation.   All pulmonary venous antra were isolated successfully. Post ablation, pacing was done within and outside of the pulmonary vein to demonstrate conduction block both into and out of the vein.The total time from last ablation lesion to confirmation of pulmonary vein entrance block was about 30 minutes. After completion of ablation, an EP study was performed including left atrial pacing and recording.    An esophageal temperature probe was used to monitor temperatures on the posterior wall. The esophagus course was midline. We were prepared to come off RF immediately when the esophageal temperature probe increased more than 1.0-degree C or to a max temperature of 38.5.  If a significant temperature rise was seen, the location was marked separately on the 3D non-contact map and maximum temperature achieved was recorded.  We did not have to limit ablation lesions due to a rise in temperature. Resting and often going to other areas was performed when ablating near the esophagus to allow recovery of the esophagus between lesions. 20-25 duncan was used on the posterior wall of the left atrium and up to 30-35 duncan on the anterior wall. Points were marked with different colors on the 3D non-contact map based on time of treatment at each site. The anesthesia personnel in the room moved the esophageal temperature probe in line with the level of ablation when treating the posterior wall. The electrophysiology nurse, anesthesia staff and physician continuously monitored the temperature. During ablation, the impedance was closely monitored by both the physician and the electrophysiology staff. We came off ablation once the impedance decreased by 10% indicating a transmural burn.     Contact force was also monitored continuously during the ablation. An average of 10 grams of force (5-15 grams) were used on the posterior wall at 20-25 duncan for 12-15 seconds and no longer than 20 seconds. If the electrical signals did not decrease significantly, the power was increased to 25 duncan for an additional 5-10 seconds at each site.    Phrenic evaluation: Pacing was utilized with ablation anterior to the right sided pulmonary veins to confirm no phrenic capture at sites of ablation.     Dissociated Firing of the PVs: Did not occur.    Vagal Effect:  No marked response was present.    Atrial Scarring: The atrial scarring included the periantral regions.     INTRAATRIAL PACING: Entrance and exit block across all four PVs was confirmed by intra-atrial pacing and by pacing inside each vein.     EXTERNAL DC CARDIOVERSION:  was performed to convert to sinus after PVI.     DRUG INFUSION: Isoproterenol 20 mcg/min was initiated for 5 minutes. During this time, patient had no atrial fibrillation. There was no significant atrial ectopy.     RAPID PACING: Rapid atrial pacing x3 to 250 msec did not induce atrial fibrillation or atrial flutter. On Isuprel washout, rapid atrial pacing x3 to 250 msec did not induce atrial fibrillation or atrial flutter.     DIAGNOSTIC EP STUDY:   Intervals: Sinus,  msec, VT interval 127 msec, QRSd 120 msec,  msec.     Post ablation intracardiac echo showed no change in pericardial effusion or LA thrombus.   Sheaths were removed and 'figure-of-8' stitches was placed on right groin, and Vascade device was used for hemostasis on left groin accesses.  The patient tolerated the procedure well.         IMPRESSION:   Symptomatic paroxysmal Atrial Fibrillation status post successful pulmonary vein antral isolation   Cavo-tricuspid Isthmus Ablation  High dose isoproterenol infusion        PLAN:   - Will monitor patient overnight at the hospital.   - Check access site/sites in the morning. Sutures will be removed in am.  - Bedrest 4 hours.  - Start anticoagulation with Eliquis. 1st dose tonight after bedrest.  - Rate control: Home meds  - Rhythm control medication: None  - Protonix/PPI for 4 weeks  - IV diuresis and potassium replacement as needed.   - Outpatient follow up in 1 month

## 2021-09-28 ENCOUNTER — TRANSCRIPTION ENCOUNTER (OUTPATIENT)
Age: 61
End: 2021-09-28

## 2021-09-28 VITALS
DIASTOLIC BLOOD PRESSURE: 58 MMHG | TEMPERATURE: 98 F | RESPIRATION RATE: 18 BRPM | SYSTOLIC BLOOD PRESSURE: 123 MMHG | HEART RATE: 65 BPM

## 2021-09-28 LAB
ALBUMIN SERPL ELPH-MCNC: 4.6 G/DL — SIGNIFICANT CHANGE UP (ref 3.5–5.2)
ALP SERPL-CCNC: 50 U/L — SIGNIFICANT CHANGE UP (ref 30–115)
ALT FLD-CCNC: 27 U/L — SIGNIFICANT CHANGE UP (ref 0–41)
ANION GAP SERPL CALC-SCNC: 11 MMOL/L — SIGNIFICANT CHANGE UP (ref 7–14)
AST SERPL-CCNC: 29 U/L — SIGNIFICANT CHANGE UP (ref 0–41)
BASOPHILS # BLD AUTO: 0.01 K/UL — SIGNIFICANT CHANGE UP (ref 0–0.2)
BASOPHILS NFR BLD AUTO: 0.2 % — SIGNIFICANT CHANGE UP (ref 0–1)
BILIRUB SERPL-MCNC: 0.6 MG/DL — SIGNIFICANT CHANGE UP (ref 0.2–1.2)
BUN SERPL-MCNC: 17 MG/DL — SIGNIFICANT CHANGE UP (ref 10–20)
CALCIUM SERPL-MCNC: 9.5 MG/DL — SIGNIFICANT CHANGE UP (ref 8.5–10.1)
CHLORIDE SERPL-SCNC: 101 MMOL/L — SIGNIFICANT CHANGE UP (ref 98–110)
CO2 SERPL-SCNC: 26 MMOL/L — SIGNIFICANT CHANGE UP (ref 17–32)
COVID-19 SPIKE DOMAIN AB INTERP: POSITIVE
COVID-19 SPIKE DOMAIN ANTIBODY RESULT: >250 U/ML — HIGH
CREAT SERPL-MCNC: 1 MG/DL — SIGNIFICANT CHANGE UP (ref 0.7–1.5)
EOSINOPHIL # BLD AUTO: 0.04 K/UL — SIGNIFICANT CHANGE UP (ref 0–0.7)
EOSINOPHIL NFR BLD AUTO: 0.6 % — SIGNIFICANT CHANGE UP (ref 0–8)
GLUCOSE SERPL-MCNC: 113 MG/DL — HIGH (ref 70–99)
HCT VFR BLD CALC: 36.9 % — LOW (ref 42–52)
HGB BLD-MCNC: 12.2 G/DL — LOW (ref 14–18)
IMM GRANULOCYTES NFR BLD AUTO: 0.2 % — SIGNIFICANT CHANGE UP (ref 0.1–0.3)
LYMPHOCYTES # BLD AUTO: 1.31 K/UL — SIGNIFICANT CHANGE UP (ref 1.2–3.4)
LYMPHOCYTES # BLD AUTO: 21 % — SIGNIFICANT CHANGE UP (ref 20.5–51.1)
MAGNESIUM SERPL-MCNC: 2.3 MG/DL — SIGNIFICANT CHANGE UP (ref 1.8–2.4)
MCHC RBC-ENTMCNC: 32.9 PG — HIGH (ref 27–31)
MCHC RBC-ENTMCNC: 33.1 G/DL — SIGNIFICANT CHANGE UP (ref 32–37)
MCV RBC AUTO: 99.5 FL — HIGH (ref 80–94)
MONOCYTES # BLD AUTO: 0.66 K/UL — HIGH (ref 0.1–0.6)
MONOCYTES NFR BLD AUTO: 10.6 % — HIGH (ref 1.7–9.3)
NEUTROPHILS # BLD AUTO: 4.22 K/UL — SIGNIFICANT CHANGE UP (ref 1.4–6.5)
NEUTROPHILS NFR BLD AUTO: 67.4 % — SIGNIFICANT CHANGE UP (ref 42.2–75.2)
NRBC # BLD: 0 /100 WBCS — SIGNIFICANT CHANGE UP (ref 0–0)
PLATELET # BLD AUTO: 335 K/UL — SIGNIFICANT CHANGE UP (ref 130–400)
POTASSIUM SERPL-MCNC: 5.1 MMOL/L — HIGH (ref 3.5–5)
POTASSIUM SERPL-SCNC: 5.1 MMOL/L — HIGH (ref 3.5–5)
PROT SERPL-MCNC: 7.3 G/DL — SIGNIFICANT CHANGE UP (ref 6–8)
RBC # BLD: 3.71 M/UL — LOW (ref 4.7–6.1)
RBC # FLD: 12.3 % — SIGNIFICANT CHANGE UP (ref 11.5–14.5)
SARS-COV-2 IGG+IGM SERPL QL IA: >250 U/ML — HIGH
SARS-COV-2 IGG+IGM SERPL QL IA: POSITIVE
SODIUM SERPL-SCNC: 138 MMOL/L — SIGNIFICANT CHANGE UP (ref 135–146)
WBC # BLD: 6.25 K/UL — SIGNIFICANT CHANGE UP (ref 4.8–10.8)
WBC # FLD AUTO: 6.25 K/UL — SIGNIFICANT CHANGE UP (ref 4.8–10.8)

## 2021-09-28 PROCEDURE — 99239 HOSP IP/OBS DSCHRG MGMT >30: CPT

## 2021-09-28 PROCEDURE — 93010 ELECTROCARDIOGRAM REPORT: CPT

## 2021-09-28 RX ORDER — PANTOPRAZOLE SODIUM 20 MG/1
40 TABLET, DELAYED RELEASE ORAL
Refills: 0 | Status: DISCONTINUED | OUTPATIENT
Start: 2021-09-28 | End: 2021-09-28

## 2021-09-28 RX ORDER — PANTOPRAZOLE SODIUM 20 MG/1
1 TABLET, DELAYED RELEASE ORAL
Qty: 30 | Refills: 0
Start: 2021-09-28 | End: 2021-10-27

## 2021-09-28 RX ORDER — RIVAROXABAN 15 MG-20MG
1 KIT ORAL
Qty: 30 | Refills: 2
Start: 2021-09-28 | End: 2021-12-26

## 2021-09-28 RX ORDER — INFLUENZA VIRUS VACCINE 15; 15; 15; 15 UG/.5ML; UG/.5ML; UG/.5ML; UG/.5ML
0.5 SUSPENSION INTRAMUSCULAR ONCE
Refills: 0 | Status: DISCONTINUED | OUTPATIENT
Start: 2021-09-28 | End: 2021-09-28

## 2021-09-28 RX ORDER — APIXABAN 2.5 MG/1
1 TABLET, FILM COATED ORAL
Qty: 60 | Refills: 2
Start: 2021-09-28 | End: 2021-12-26

## 2021-09-28 RX ADMIN — APIXABAN 5 MILLIGRAM(S): 2.5 TABLET, FILM COATED ORAL at 05:17

## 2021-09-28 RX ADMIN — PANTOPRAZOLE SODIUM 40 MILLIGRAM(S): 20 TABLET, DELAYED RELEASE ORAL at 11:34

## 2021-09-28 RX ADMIN — Medication 50 MILLIGRAM(S): at 05:17

## 2021-09-28 RX ADMIN — ATORVASTATIN CALCIUM 10 MILLIGRAM(S): 80 TABLET, FILM COATED ORAL at 11:34

## 2021-09-28 NOTE — DISCHARGE NOTE PROVIDER - NSDCMRMEDTOKEN_GEN_ALL_CORE_FT
Lipitor 10 mg oral tablet: 1 tab(s) orally once a day  metoprolol succinate 50 mg oral capsule, extended release: 1 cap(s) orally once a day  Metoprolol Succinate ER 25 mg oral tablet, extended release: 1 tab(s) orally once a day  pantoprazole 40 mg oral delayed release tablet: 1 tab(s) orally once a day (before a meal) MDD:1  Xarelto 20 mg oral tablet: 1 tab(s) orally once a day MDD:1    Take with dinner

## 2021-09-28 NOTE — DISCHARGE NOTE NURSING/CASE MANAGEMENT/SOCIAL WORK - PATIENT PORTAL LINK FT
You can access the FollowMyHealth Patient Portal offered by [facility] by registering at the following website: [email or web address]. By joining [employer]’s FollowMyHealth portal, you will also be able to view your health information using other applications (apps) compatible with our system.

## 2021-09-28 NOTE — DISCHARGE NOTE PROVIDER - HOSPITAL COURSE
This is a 59 y/o male with PMH severe obesity, RAZIA (Bipap), HTN, HLD PAF (CHADSVASC 0) who presents for AF ablation, POD #1. No immediate complications noted.

## 2021-09-28 NOTE — PROGRESS NOTE ADULT - ASSESSMENT
EP: Dr. Veras    This is a 61 y/o male with PMH severe obesity, RAZIA (Bipap), HTN, HLD PAF (CHADSVASC 0) who presents for AF ablation, POD #1. No immediate complications noted.    Plan:  - On ELiquis 5 mg PO Q 12, insurance does not cover. Awaiting authorization for Xarelto  - Cont AC uninterrupted x 3 months, no elective procedure  - Cont Toprol 75 mg XL daily  - Lifestyle modification- weight loss, diet, BiPAP compliance  - EKG noted  - Ambulate after 11 AM    Discharge Instructions:  - Do NOT stop blood thinners unless discussed with doctor  - Cont Protonix 40 mg daily x 30 days  - No heavy lifting > 10 lbs, squatting, or exertional activities for 5-7days  - Can take a shower starting tomorrow  - No submerging in water for 1 week  - No driving for 5 days  - FU in office on 10/27 at 9:30 AM

## 2021-09-28 NOTE — DISCHARGE NOTE PROVIDER - PROVIDER TOKENS
PROVIDER:[TOKEN:[[id number]],SCHEDULEDAPPT:[10/27/2021],SCHEDULEDAPPTTIME:[09:30 AM],ESTABLISHEDPATIENT:[T]]

## 2021-09-28 NOTE — DISCHARGE NOTE PROVIDER - NSDCFUADDINST_GEN_ALL_CORE_FT
- Do NOT stop blood thinners unless discussed with doctor  - Cont Protonix 40 mg daily x 30 days  - No heavy lifting > 10 lbs, squatting, or exertional activities for 5-7days  - Can take a shower starting tomorrow  - No submerging in water for 1 week  - No driving for 5 days  - FU in office on 10/27 at 9:30 AM

## 2021-09-28 NOTE — PROGRESS NOTE ADULT - SUBJECTIVE AND OBJECTIVE BOX
INTERVAL HPI/OVERNIGHT EVENTS:  Pt is POD #1 AF ablation. No acute events overnight. In NSR, no tele events noted. Rt groin suture removed.  Patient denies fever, chills, dizziness, syncope, chest pain, palpitations, SOB, cough, abd pain, n/v/d/c, dysuria, hematuria or unusual rash.     MEDICATIONS  (STANDING):  apixaban 5 milliGRAM(s) Oral every 12 hours  atorvastatin Oral Tab/Cap - Peds 10 milliGRAM(s) Oral daily  influenza   Vaccine 0.5 milliLiter(s) IntraMuscular once  metoprolol succinate ER 50 milliGRAM(s) Oral daily  metoprolol succinate ER 25 milliGRAM(s) Oral daily    MEDICATIONS  (PRN):  acetaminophen   Tablet .. 650 milliGRAM(s) Oral once PRN Mild Pain (1 - 3)  oxyCODONE    IR 5 milliGRAM(s) Oral once PRN Moderate Pain (4 - 6)      Allergies  No Known Allergies          REVIEW OF SYSTEMS    [x] A ten-point review of systems was otherwise negative except as noted.  [ ] Due to altered mental status/intubation, subjective information were not able to be obtained from the patient. History was obtained, to the extent possible, from review of the chart and collateral sources of information.      Vital Signs Last 24 Hrs  T(C): 35.9 (28 Sep 2021 05:32), Max: 36.7 (27 Sep 2021 21:40)  T(F): 96.6 (28 Sep 2021 05:32), Max: 98 (27 Sep 2021 21:40)  HR: 65 (28 Sep 2021 05:32) (65 - 71)  BP: 115/60 (28 Sep 2021 05:32) (109/67 - 115/60)  BP(mean): --  RR: 18 (28 Sep 2021 05:32) (18 - 18)  SpO2: --    09-27-21 @ 07:01  -  09-28-21 @ 07:00  --------------------------------------------------------  IN: 100 mL / OUT: 0 mL / NET: 100 mL        Physical Exam  GENERAL: Well appearing, overweight male. In no apparent distress, well nourished, and hydrated.  HEART: Regular rate and rhythm; No murmurs, rubs, or gallops.  PULMONARY: Clear to auscultation and perfusion.  No rales, wheezing, or rhonchi bilaterally.  ABDOMEN: Soft, Nontender, Nondistended; Bowel sounds present  EXTREMITIES: B/L groins soft, without hematoma/drainage noted. 2+ Peripheral Pulses, No clubbing, cyanosis, or edema  NEUROLOGICAL: AO x4, LUTZ< speech clear.    LABS:                        12.2   6.25  )-----------( 335      ( 28 Sep 2021 08:33 )             36.9     09-28    138  |  101  |  17  ----------------------------<  113<H>  5.1<H>   |  26  |  1.0    Ca    9.5      28 Sep 2021 08:33  Mg     2.3     09-28    TPro  7.3  /  Alb  4.6  /  TBili  0.6  /  DBili  x   /  AST  29  /  ALT  27  /  AlkPhos  50  09-28 09-27-21 @ 07:01  -  09-28-21 @ 07:00  --------------------------------------------------------  IN: 100 mL / OUT: 0 mL / NET: 100 mL        09-27-21 @ 07:01  -  09-28-21 @ 07:00  --------------------------------------------------------  IN: 100 mL / OUT: 0 mL / NET: 100 mL      RADIOLOGY & ADDITIONAL TESTS:  < from: 12 Lead ECG (09.28.21 @ 07:34) >    Ventricular Rate 63 BPM    Atrial Rate 63 BPM    P-R Interval 186 ms    QRS Duration 98 ms    Q-T Interval 414 ms    QTC Calculation(Bazett) 423 ms    P Axis 54 degrees    R Axis 54 degrees    T Axis 46 degrees    Diagnosis Line Normal sinus rhythm  Normal ECG    < end of copied text >

## 2021-10-02 DIAGNOSIS — Z79.01 LONG TERM (CURRENT) USE OF ANTICOAGULANTS: ICD-10-CM

## 2021-10-02 DIAGNOSIS — Z82.49 FAMILY HISTORY OF ISCHEMIC HEART DISEASE AND OTHER DISEASES OF THE CIRCULATORY SYSTEM: ICD-10-CM

## 2021-10-02 DIAGNOSIS — I31.3 PERICARDIAL EFFUSION (NONINFLAMMATORY): ICD-10-CM

## 2021-10-02 DIAGNOSIS — E78.5 HYPERLIPIDEMIA, UNSPECIFIED: ICD-10-CM

## 2021-10-02 DIAGNOSIS — G47.33 OBSTRUCTIVE SLEEP APNEA (ADULT) (PEDIATRIC): ICD-10-CM

## 2021-10-02 DIAGNOSIS — E66.01 MORBID (SEVERE) OBESITY DUE TO EXCESS CALORIES: ICD-10-CM

## 2021-10-02 DIAGNOSIS — Z99.89 DEPENDENCE ON OTHER ENABLING MACHINES AND DEVICES: ICD-10-CM

## 2021-10-06 ENCOUNTER — NON-APPOINTMENT (OUTPATIENT)
Age: 61
End: 2021-10-06

## 2021-10-06 ENCOUNTER — APPOINTMENT (OUTPATIENT)
Dept: CARDIOLOGY | Facility: CLINIC | Age: 61
End: 2021-10-06
Payer: COMMERCIAL

## 2021-10-06 ENCOUNTER — RESULT CHARGE (OUTPATIENT)
Age: 61
End: 2021-10-06

## 2021-10-06 VITALS
OXYGEN SATURATION: 93 % | RESPIRATION RATE: 16 BRPM | HEART RATE: 65 BPM | SYSTOLIC BLOOD PRESSURE: 110 MMHG | BODY MASS INDEX: 42.66 KG/M2 | WEIGHT: 315 LBS | TEMPERATURE: 98.4 F | HEIGHT: 72 IN | DIASTOLIC BLOOD PRESSURE: 80 MMHG

## 2021-10-06 PROCEDURE — 93000 ELECTROCARDIOGRAM COMPLETE: CPT

## 2021-10-06 PROCEDURE — 99213 OFFICE O/P EST LOW 20 MIN: CPT

## 2021-10-06 RX ORDER — METOPROLOL SUCCINATE 25 MG/1
25 TABLET, EXTENDED RELEASE ORAL
Refills: 0 | Status: DISCONTINUED | COMMUNITY
End: 2021-10-06

## 2021-10-06 NOTE — ASSESSMENT
[FreeTextEntry1] : ## Persistent AF s/p DCCV s/p AF ablation (RF/PVI, Me, 09/21)\par ## Non-ischemic CM\par \par - CHADSVASC: 0. On Xarelto due to recent AF ablation. Will consider DCing in 3 months.\par - Continue metoprolol for now. 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

## 2021-10-06 NOTE — PHYSICAL EXAM
[Well Developed] : well developed [Well Nourished] : well nourished [No Acute Distress] : no acute distress [Obese] : obese [Normal Conjunctiva] : normal conjunctiva [Normal Venous Pressure] : normal venous pressure [No Carotid Bruit] : no carotid bruit [No Murmur] : no murmur [No Rub] : no rub [No Gallop] : no gallop [Irregularly Irregular] : irregularly irregular [Clear Lung Fields] : clear lung fields [Good Air Entry] : good air entry [No Respiratory Distress] : no respiratory distress  [Soft] : abdomen soft [Non Tender] : non-tender [No Masses/organomegaly] : no masses/organomegaly [Normal Bowel Sounds] : normal bowel sounds [Normal Gait] : normal gait [No Edema] : no edema [No Cyanosis] : no cyanosis [No Clubbing] : no clubbing [No Varicosities] : no varicosities [No Rash] : no rash [No Skin Lesions] : no skin lesions [Moves all extremities] : moves all extremities [No Focal Deficits] : no focal deficits [Normal Speech] : normal speech [Alert and Oriented] : alert and oriented [Normal memory] : normal memory

## 2021-10-06 NOTE — HISTORY OF PRESENT ILLNESS
[FreeTextEntry1] : I had a pleasure of seeing Mr. WEBER for follow-up consultation for Atrial Fibrillation.  He is accompanied by his wife. he works at [employer] as a . \par \par Mr. WEBER is a 60 year-year old male with history of obesity, persistent atrial fibrillation s/p DCCV (07/21), Non-ischemic CM with improvement of EF after DCCV, RAZIA on CPAP- compliant is found to have AF again.\par \par ? Fatigue\par \par 10/6: s/p AF ablation (RF-PVI). Feels better. More energetic.\par Denies chest pain, shortness of breath, palpitation, dizziness or LOC except noted above.\par \par EKG (10/21): SR 88,  ms, QRSd 88 ms\par EKG: AF @ 67\par TTE (04/21): Nl EF, Mild LAE\par LHC (10/20): Mild irreg\par Cardio: Dr. Wesley

## 2021-10-27 ENCOUNTER — APPOINTMENT (OUTPATIENT)
Dept: CARDIOLOGY | Facility: CLINIC | Age: 61
End: 2021-10-27

## 2021-12-29 ENCOUNTER — APPOINTMENT (OUTPATIENT)
Age: 61
End: 2021-12-29
Payer: COMMERCIAL

## 2021-12-29 PROCEDURE — 99442: CPT | Mod: 95

## 2021-12-29 NOTE — HISTORY OF PRESENT ILLNESS
[TextBox_4] : RAZIA COMPLIANT AND BENEFITING RECEIVED NEW MACHINE THROUGH [employer] ( OLD MACHINE RECALLED)
Abnormal WBC: < 4,000 OR > 12,000
3.18

## 2021-12-29 NOTE — PROCEDURE
[FreeTextEntry1] : RAZIA COMPLIANT AND BENEFITING, WILL CALL NEW COMPANY TO GET DOWNLOAD AND CLEAR PATIENT/ [employer] \par WEIGHT LOSS\par CARDIO NOTE REVIEWED

## 2022-01-05 ENCOUNTER — APPOINTMENT (OUTPATIENT)
Dept: CARDIOLOGY | Facility: CLINIC | Age: 62
End: 2022-01-05

## 2022-01-05 ENCOUNTER — APPOINTMENT (OUTPATIENT)
Dept: CARDIOLOGY | Facility: CLINIC | Age: 62
End: 2022-01-05
Payer: COMMERCIAL

## 2022-01-05 VITALS
HEART RATE: 62 BPM | RESPIRATION RATE: 16 BRPM | SYSTOLIC BLOOD PRESSURE: 114 MMHG | HEIGHT: 72 IN | BODY MASS INDEX: 42.66 KG/M2 | OXYGEN SATURATION: 97 % | DIASTOLIC BLOOD PRESSURE: 80 MMHG | TEMPERATURE: 97.3 F | WEIGHT: 315 LBS

## 2022-01-05 PROCEDURE — 99214 OFFICE O/P EST MOD 30 MIN: CPT

## 2022-01-05 PROCEDURE — 93000 ELECTROCARDIOGRAM COMPLETE: CPT

## 2022-01-05 NOTE — HISTORY OF PRESENT ILLNESS
[FreeTextEntry1] : I had a pleasure of seeing Mr. WEBER for follow-up consultation for Atrial Fibrillation.  He is accompanied by his wife. he works at [employer] as a . \par \par Mr. WEBER is a 60 year-year old male with history of obesity, persistent atrial fibrillation s/p DCCV (07/21), Non-ischemic CM with improvement of EF after DCCV, RAZIA on CPAP- compliant is found to have AF again.\par \par ? Fatigue\par \par 10/6: s/p AF ablation (RF-PVI). Feels better. More energetic.\par 1/5/22: Feels better. No new events.\par \par Denies chest pain, shortness of breath, palpitation, dizziness or LOC except noted above.\par \par EKG (01/5/22): SR@ 62,  ms, QRSd 414 ms\par EKG (10/21): SR 88,  ms, QRSd 88 ms\par EKG: AF @ 67\par TTE (04/21): Nl EF, Mild LAE\par LHC (10/20): Mild irreg\par Cardio: Dr. Wesley

## 2022-01-05 NOTE — ASSESSMENT
[FreeTextEntry1] : ## Persistent AF s/p DCCV s/p AF ablation (RF/PVI, Me, 09/21)\par ## Non-ischemic CM\par \par - CHADSVASC: 0. On Xarelto due to recent AF ablation. No recurrence of AF. Discussed pros-cons of OAC. Considering low CHADSVASC and no further intervention planned, we decided to stop OAC. \par - We will also DC metoprolol\par - PPI for next 2 weeks.\par - Discussed importance of risk factor management.\par - RAZIA on CPAP- new machine\par - Diet/exercise/weight loss\par - Management of GERD if present. \par - Cardio F-up\par - Return in 6 months

## 2022-03-01 ENCOUNTER — APPOINTMENT (OUTPATIENT)
Dept: CARDIOLOGY | Facility: CLINIC | Age: 62
End: 2022-03-01
Payer: COMMERCIAL

## 2022-03-01 VITALS
DIASTOLIC BLOOD PRESSURE: 86 MMHG | HEART RATE: 69 BPM | WEIGHT: 315 LBS | SYSTOLIC BLOOD PRESSURE: 140 MMHG | HEIGHT: 72 IN | BODY MASS INDEX: 42.66 KG/M2

## 2022-03-01 PROCEDURE — 99214 OFFICE O/P EST MOD 30 MIN: CPT

## 2022-03-01 PROCEDURE — 93000 ELECTROCARDIOGRAM COMPLETE: CPT

## 2022-03-01 NOTE — CARDIOLOGY SUMMARY
[___] : [unfilled] [de-identified] : 8- AF controlled VR .  [de-identified] : 4- Normal V systolic functio mild MR mild TR .

## 2022-03-01 NOTE — HISTORY OF PRESENT ILLNESS
[FreeTextEntry1] : The patient has had no further AF . Metoprolol and Eliquis had been stopped after AF ablation . The patient has not had chest pain . No SOB

## 2022-03-01 NOTE — ASSESSMENT
[FreeTextEntry1] : The patient has had a AF PVI ablation and has rmeined in NSR . A/C was stopped by EP . He does have risk factors for CAD .

## 2022-04-07 ENCOUNTER — APPOINTMENT (OUTPATIENT)
Dept: CARDIOLOGY | Facility: CLINIC | Age: 62
End: 2022-04-07

## 2022-04-14 ENCOUNTER — APPOINTMENT (OUTPATIENT)
Dept: CARDIOLOGY | Facility: CLINIC | Age: 62
End: 2022-04-14
Payer: COMMERCIAL

## 2022-04-14 PROCEDURE — 93306 TTE W/DOPPLER COMPLETE: CPT

## 2022-06-01 ENCOUNTER — APPOINTMENT (OUTPATIENT)
Age: 62
End: 2022-06-01
Payer: COMMERCIAL

## 2022-06-01 VITALS
DIASTOLIC BLOOD PRESSURE: 86 MMHG | HEIGHT: 72 IN | HEART RATE: 74 BPM | BODY MASS INDEX: 42.66 KG/M2 | WEIGHT: 315 LBS | OXYGEN SATURATION: 98 % | SYSTOLIC BLOOD PRESSURE: 132 MMHG | RESPIRATION RATE: 14 BRPM

## 2022-06-01 PROCEDURE — 99213 OFFICE O/P EST LOW 20 MIN: CPT

## 2022-06-01 NOTE — DISCUSSION/SUMMARY
[FreeTextEntry1] : RAZIA COMPLIANT AND BENEFITING REVIEW DOWNLOAD, DENIES EDS\par WEIGHT LOSS\par CARDIO NOTE REVIEWED

## 2022-06-09 ENCOUNTER — APPOINTMENT (OUTPATIENT)
Dept: CARDIOLOGY | Facility: CLINIC | Age: 62
End: 2022-06-09
Payer: COMMERCIAL

## 2022-06-09 VITALS
HEIGHT: 72 IN | HEART RATE: 65 BPM | WEIGHT: 315 LBS | TEMPERATURE: 98 F | SYSTOLIC BLOOD PRESSURE: 121 MMHG | BODY MASS INDEX: 42.66 KG/M2 | RESPIRATION RATE: 16 BRPM | DIASTOLIC BLOOD PRESSURE: 70 MMHG

## 2022-06-09 PROCEDURE — 93000 ELECTROCARDIOGRAM COMPLETE: CPT

## 2022-06-09 PROCEDURE — 99214 OFFICE O/P EST MOD 30 MIN: CPT

## 2022-06-12 NOTE — ASSESSMENT
[FreeTextEntry1] : ## Persistent AF s/p DCCV s/p AF ablation (RF/PVI, Me, 09/21)\par ## Non-ischemic CM\par \par - CHADSVASC: 0. On Xarelto due to recent AF ablation. No recurrence of AF. Discussed pros-cons of OAC. Considering low CHADSVASC and no further intervention planned, we decided to stop OAC. \par - Off metoprolol\par - Discussed importance of risk factor management.\par - RAZIA on CPAP- new machine\par - Diet/exercise/weight loss\par - Management of GERD if present. \par - Cardio F-up\par - Return in1 year
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
## 2022-07-28 ENCOUNTER — APPOINTMENT (OUTPATIENT)
Dept: CARDIOLOGY | Facility: CLINIC | Age: 62
End: 2022-07-28

## 2022-07-28 VITALS
HEART RATE: 61 BPM | SYSTOLIC BLOOD PRESSURE: 140 MMHG | DIASTOLIC BLOOD PRESSURE: 86 MMHG | TEMPERATURE: 97.2 F | HEIGHT: 72 IN

## 2022-07-28 VITALS — WEIGHT: 315 LBS | BODY MASS INDEX: 44.35 KG/M2

## 2022-07-28 PROCEDURE — 93000 ELECTROCARDIOGRAM COMPLETE: CPT

## 2022-07-28 PROCEDURE — 99214 OFFICE O/P EST MOD 30 MIN: CPT | Mod: 25

## 2022-07-28 NOTE — CARDIOLOGY SUMMARY
[___] : [unfilled] [de-identified] : 8- AF controlled VR . \par 7- NSR First degree AV block  [de-identified] : 4- Normal V systolic functio mild MR mild TR . \par 4- Normal LV systolic function Trace MR mild TR

## 2022-07-28 NOTE — HISTORY OF PRESENT ILLNESS
[FreeTextEntry1] : The patient has been feeling well. No fadi pain  No palpitations . Overall he feels well. Jamil has an AF ablation .A/C has been stopped secondary to low CHADSvasc score .

## 2022-07-28 NOTE — ASSESSMENT
[FreeTextEntry1] : The patient has PAF , S/P PVI /AF ablation without recurrances . The patient is now off of A/C and metoprolol . He has had no chest pain or SOB . He had no signficant CAD in 2020 after cath and his echo shows normal LV systolic function . The patient works as a  . 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 The patient has RAZIA and is using Bipap and this should be cleared by the [facility] physicians . LDL is increased will start statins

## 2022-09-22 ENCOUNTER — APPOINTMENT (OUTPATIENT)
Dept: CARDIOLOGY | Facility: CLINIC | Age: 62
End: 2022-09-22

## 2022-09-22 PROCEDURE — 93015 CV STRESS TEST SUPVJ I&R: CPT

## 2022-12-07 ENCOUNTER — APPOINTMENT (OUTPATIENT)
Age: 62
End: 2022-12-07

## 2022-12-07 VITALS
OXYGEN SATURATION: 95 % | RESPIRATION RATE: 14 BRPM | HEART RATE: 75 BPM | SYSTOLIC BLOOD PRESSURE: 138 MMHG | DIASTOLIC BLOOD PRESSURE: 86 MMHG | WEIGHT: 315 LBS | BODY MASS INDEX: 42.66 KG/M2 | HEIGHT: 72 IN

## 2022-12-07 PROCEDURE — 99213 OFFICE O/P EST LOW 20 MIN: CPT

## 2022-12-07 NOTE — DISCUSSION/SUMMARY
[FreeTextEntry1] : RAZIA COMPLAINT AND BENEFITING\par REVIEWED AND DISCUSSED DOWNLOAD\par PATIENT WAS GIVEN A LETTER FOR HIS JOB ( [employer] /)\par WEIGHT LOSS\par ORDER SUPPLIES

## 2023-01-31 ENCOUNTER — APPOINTMENT (OUTPATIENT)
Dept: CARDIOLOGY | Facility: CLINIC | Age: 63
End: 2023-01-31
Payer: COMMERCIAL

## 2023-01-31 VITALS
BODY MASS INDEX: 42.66 KG/M2 | WEIGHT: 315 LBS | TEMPERATURE: 96 F | HEIGHT: 72 IN | SYSTOLIC BLOOD PRESSURE: 128 MMHG | HEART RATE: 60 BPM | DIASTOLIC BLOOD PRESSURE: 80 MMHG

## 2023-01-31 DIAGNOSIS — Z01.818 ENCOUNTER FOR OTHER PREPROCEDURAL EXAMINATION: ICD-10-CM

## 2023-01-31 PROCEDURE — 99214 OFFICE O/P EST MOD 30 MIN: CPT | Mod: 25

## 2023-01-31 PROCEDURE — 93000 ELECTROCARDIOGRAM COMPLETE: CPT

## 2023-01-31 NOTE — ASSESSMENT
[FreeTextEntry1] : The patient has PAF , S/P PVI /AF ablation without recurrences . The patient is now off of A/C and metoprolol . He has had no chest pain or SOB . He had no significant CAD in 2020 . The patient has had no chest pain or SOB . ETT was negative for ischemia into stage III . 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 The patient has been using his CPAP and follows with pulmnary

## 2023-01-31 NOTE — REASON FOR VISIT
Referred To Oculoplastics For Closure Text (Leave Blank If You Do Not Want): After obtaining clear surgical margins the patient was sent to oculoplastics for surgical repair.  The patient understands they will receive post-surgical care and follow-up from the referring physician's office. [Arrhythmia/ECG Abnorrmalities] : arrhythmia/ECG abnormalities [FreeTextEntry3] : Dr. De Luna  [Consultation] : a consultation regarding [Atrial Fibrillation] : atrial fibrillation [Cardiomyopathy] : cardiomyopathy

## 2023-01-31 NOTE — CARDIOLOGY SUMMARY
[de-identified] : 8- AF controlled VR . \par 7- NSR First degree AV block  [de-identified] : 9- ETT Completed 6 minutes and 51 seconds No ischemia  . [de-identified] : 4- Normal V systolic functio mild MR mild TR . \par 4- Normal LV systolic function Trace MR mild TR  [___] : [unfilled]
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
## 2023-01-31 NOTE — PHYSICAL EXAM
[General Appearance - Well Developed] : well developed [Normal Appearance] : normal appearance [Well Groomed] : well groomed [General Appearance - Well Nourished] : well nourished [No Deformities] : no deformities [General Appearance - In No Acute Distress] : no acute distress [Normal Conjunctiva] : the conjunctiva exhibited no abnormalities [Eyelids - No Xanthelasma] : the eyelids demonstrated no xanthelasmas [Normal Oral Mucosa] : normal oral mucosa [No Oral Pallor] : no oral pallor [No Oral Cyanosis] : no oral cyanosis [Normal Jugular Venous A Waves Present] : normal jugular venous A waves present [Normal Jugular Venous V Waves Present] : normal jugular venous V waves present [No Jugular Venous Prado A Waves] : no jugular venous prado A waves [FreeTextEntry1] : No Bruit  [Normal Rate] : normal [Rhythm Regular] : regular [No Murmur] : no murmurs heard [2+] : left 2+ [No Pitting Edema] : no pitting edema present [Respiration, Rhythm And Depth] : normal respiratory rhythm and effort [Exaggerated Use Of Accessory Muscles For Inspiration] : no accessory muscle use [Auscultation Breath Sounds / Voice Sounds] : lungs were clear to auscultation bilaterally [Abdomen Soft] : soft [Abdomen Tenderness] : non-tender [Abdomen Mass (___ Cm)] : no abdominal mass palpated [Abnormal Walk] : normal gait [Nail Clubbing] : no clubbing of the fingernails [Cyanosis, Localized] : no localized cyanosis [Petechial Hemorrhages (___cm)] : no petechial hemorrhages [Skin Color & Pigmentation] : normal skin color and pigmentation [No Venous Stasis] : no venous stasis [] : no rash [Skin Lesions] : no skin lesions [No Skin Ulcers] : no skin ulcer [No Xanthoma] : no  xanthoma was observed

## 2023-06-03 ENCOUNTER — NON-APPOINTMENT (OUTPATIENT)
Age: 63
End: 2023-06-03

## 2023-06-07 ENCOUNTER — APPOINTMENT (OUTPATIENT)
Dept: PULMONOLOGY | Facility: CLINIC | Age: 63
End: 2023-06-07
Payer: COMMERCIAL

## 2023-06-07 VITALS
WEIGHT: 296 LBS | HEIGHT: 72 IN | DIASTOLIC BLOOD PRESSURE: 78 MMHG | HEART RATE: 67 BPM | SYSTOLIC BLOOD PRESSURE: 136 MMHG | OXYGEN SATURATION: 96 % | BODY MASS INDEX: 40.09 KG/M2 | RESPIRATION RATE: 14 BRPM

## 2023-06-07 PROCEDURE — 99213 OFFICE O/P EST LOW 20 MIN: CPT

## 2023-06-07 NOTE — DISCUSSION/SUMMARY
[FreeTextEntry1] : RAZIA COMPLAINT AND BENEFITING\par REVIEWED AND DISCUSSED DOWNLOAD\par WEIGHT LOSS\par ORDER SUPPLIES

## 2023-06-08 ENCOUNTER — APPOINTMENT (OUTPATIENT)
Dept: CARDIOLOGY | Facility: CLINIC | Age: 63
End: 2023-06-08

## 2023-06-08 ENCOUNTER — APPOINTMENT (OUTPATIENT)
Dept: ELECTROPHYSIOLOGY | Facility: CLINIC | Age: 63
End: 2023-06-08
Payer: COMMERCIAL

## 2023-06-08 VITALS
HEART RATE: 59 BPM | WEIGHT: 296 LBS | SYSTOLIC BLOOD PRESSURE: 140 MMHG | TEMPERATURE: 97.1 F | RESPIRATION RATE: 16 BRPM | BODY MASS INDEX: 40.09 KG/M2 | DIASTOLIC BLOOD PRESSURE: 80 MMHG | HEIGHT: 72 IN

## 2023-06-08 PROCEDURE — 99214 OFFICE O/P EST MOD 30 MIN: CPT | Mod: 25

## 2023-06-08 PROCEDURE — 93000 ELECTROCARDIOGRAM COMPLETE: CPT

## 2023-06-08 NOTE — ADDENDUM
[FreeTextEntry1] : Nikole JACOB assisted in documentation on 06/08/2023 acting as a scribe for Dr. Kalia Veras.\par
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
## 2023-06-08 NOTE — ASSESSMENT
[FreeTextEntry1] : ## Persistent AF s/p DCCV s/p AF ablation ([facility]I, [place], 09/21)\par ## Non-ischemic CM\par \par - CHADSVASC: 0. On Xarelto due to recent AF ablation. No recurrence of AF. Discussed pros-cons of OAC. Considering low CHADSVASC and no further intervention planned, we decided to stop OAC. \par - Off metoprolol\par - Discussed importance of risk factor management.\par - RAZIA on CPAP- new machine\par - Diet/exercise/weight loss\par - Management of GERD if present. \par - Cardio F-up\par - Return as needed.

## 2023-08-15 ENCOUNTER — APPOINTMENT (OUTPATIENT)
Dept: CARDIOLOGY | Facility: CLINIC | Age: 63
End: 2023-08-15
Payer: COMMERCIAL

## 2023-08-15 VITALS
WEIGHT: 296 LBS | HEIGHT: 72 IN | DIASTOLIC BLOOD PRESSURE: 78 MMHG | SYSTOLIC BLOOD PRESSURE: 120 MMHG | HEART RATE: 61 BPM | BODY MASS INDEX: 40.09 KG/M2

## 2023-08-15 PROCEDURE — 93000 ELECTROCARDIOGRAM COMPLETE: CPT

## 2023-08-15 PROCEDURE — 99214 OFFICE O/P EST MOD 30 MIN: CPT | Mod: 25

## 2023-08-15 NOTE — HISTORY OF PRESENT ILLNESS
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 The patient has not had further  palpitations or CP or SOB

## 2023-08-15 NOTE — CARDIOLOGY SUMMARY
[de-identified] : 8- AF controlled VR .  7- NSR First degree AV block  8- NSR Normal ECG  [de-identified] : 9- ETT Completed 6 minutes and 51 seconds No ischemia  . [de-identified] : 4- Normal V systolic functio mild MR mild TR . \par  4- Normal LV systolic function Trace MR mild TR  [___] : [unfilled]

## 2023-08-15 NOTE — ASSESSMENT
[FreeTextEntry1] : The patient has PAF  S/P AF ablation and has not had recurrence since he had the ablation  .The patient has not had chest pain or SOB . No palpiations . Negative ETT  last year . .  He has RAZIA and is using his Bibap.  Seeing Dr. sutton

## 2023-08-15 NOTE — PHYSICAL EXAM
[General Appearance - Well Developed] : well developed [Normal Appearance] : normal appearance [Well Groomed] : well groomed [General Appearance - Well Nourished] : well nourished [No Deformities] : no deformities [General Appearance - In No Acute Distress] : no acute distress [Normal Conjunctiva] : the conjunctiva exhibited no abnormalities [Eyelids - No Xanthelasma] : the eyelids demonstrated no xanthelasmas [Normal Oral Mucosa] : normal oral mucosa [No Oral Pallor] : no oral pallor [No Oral Cyanosis] : no oral cyanosis [Normal Jugular Venous A Waves Present] : normal jugular venous A waves present [Normal Jugular Venous V Waves Present] : normal jugular venous V waves present [No Jugular Venous Prado A Waves] : no jugular venous prado A waves [FreeTextEntry1] : No Bruit  [Normal Rate] : normal [Rhythm Regular] : regular [No Murmur] : no murmurs heard [2+] : left 2+ [No Pitting Edema] : no pitting edema present [Respiration, Rhythm And Depth] : normal respiratory rhythm and effort [Exaggerated Use Of Accessory Muscles For Inspiration] : no accessory muscle use [Abdomen Soft] : soft [Auscultation Breath Sounds / Voice Sounds] : lungs were clear to auscultation bilaterally [Abdomen Tenderness] : non-tender [Abdomen Mass (___ Cm)] : no abdominal mass palpated [Abnormal Walk] : normal gait [Nail Clubbing] : no clubbing of the fingernails [Cyanosis, Localized] : no localized cyanosis [Petechial Hemorrhages (___cm)] : no petechial hemorrhages [Skin Color & Pigmentation] : normal skin color and pigmentation [] : no rash [No Venous Stasis] : no venous stasis [Skin Lesions] : no skin lesions [No Skin Ulcers] : no skin ulcer [No Xanthoma] : no  xanthoma was observed

## 2023-08-15 NOTE — REASON FOR VISIT
[Arrhythmia/ECG Abnorrmalities] : arrhythmia/ECG abnormalities [FreeTextEntry3] : Dr. De Luna  [Consultation] : a consultation regarding [Atrial Fibrillation] : atrial fibrillation [Cardiomyopathy] : cardiomyopathy

## 2024-03-12 ENCOUNTER — APPOINTMENT (OUTPATIENT)
Dept: CARDIOLOGY | Facility: CLINIC | Age: 64
End: 2024-03-12
Payer: COMMERCIAL

## 2024-03-12 VITALS
SYSTOLIC BLOOD PRESSURE: 138 MMHG | HEIGHT: 72 IN | DIASTOLIC BLOOD PRESSURE: 78 MMHG | WEIGHT: 310 LBS | BODY MASS INDEX: 41.99 KG/M2 | HEART RATE: 61 BPM

## 2024-03-12 DIAGNOSIS — G47.33 OBSTRUCTIVE SLEEP APNEA (ADULT) (PEDIATRIC): ICD-10-CM

## 2024-03-12 DIAGNOSIS — I48.0 PAROXYSMAL ATRIAL FIBRILLATION: ICD-10-CM

## 2024-03-12 DIAGNOSIS — E66.9 OBESITY, UNSPECIFIED: ICD-10-CM

## 2024-03-12 PROCEDURE — 99214 OFFICE O/P EST MOD 30 MIN: CPT | Mod: 25

## 2024-03-12 PROCEDURE — 93000 ELECTROCARDIOGRAM COMPLETE: CPT

## 2024-03-12 RX ORDER — METOPROLOL SUCCINATE 50 MG/1
50 TABLET, EXTENDED RELEASE ORAL DAILY
Refills: 0 | Status: ACTIVE | COMMUNITY

## 2024-03-12 RX ORDER — ATORVASTATIN CALCIUM 10 MG/1
10 TABLET, FILM COATED ORAL
Qty: 90 | Refills: 3 | Status: ACTIVE | COMMUNITY
Start: 2022-07-28 | End: 1900-01-01

## 2024-03-12 NOTE — HISTORY OF PRESENT ILLNESS
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 The patient has not had further  palpitations or CP or SOB  He has been feeling well.

## 2024-03-12 NOTE — CARDIOLOGY SUMMARY
[de-identified] : 8- AF controlled VR .  7- NSR First degree AV block  8- NSR Normal ECG  [de-identified] : 4- Normal V systolic functio mild MR mild TR . \par  4- Normal LV systolic function Trace MR mild TR  [de-identified] : 9- ETT Completed 6 minutes and 51 seconds No ischemia  . [___] : [unfilled]

## 2024-03-12 NOTE — PHYSICAL EXAM
[General Appearance - Well Developed] : well developed [Normal Appearance] : normal appearance [Well Groomed] : well groomed [No Deformities] : no deformities [General Appearance - Well Nourished] : well nourished [Normal Conjunctiva] : the conjunctiva exhibited no abnormalities [General Appearance - In No Acute Distress] : no acute distress [Eyelids - No Xanthelasma] : the eyelids demonstrated no xanthelasmas [Normal Oral Mucosa] : normal oral mucosa [No Oral Pallor] : no oral pallor [Normal Jugular Venous A Waves Present] : normal jugular venous A waves present [No Oral Cyanosis] : no oral cyanosis [Normal Jugular Venous V Waves Present] : normal jugular venous V waves present [No Jugular Venous Prado A Waves] : no jugular venous prado A waves [Normal Rate] : normal [FreeTextEntry1] : No Bruit  [Rhythm Regular] : regular [No Murmur] : no murmurs heard [No Pitting Edema] : no pitting edema present [2+] : left 2+ [Respiration, Rhythm And Depth] : normal respiratory rhythm and effort [Exaggerated Use Of Accessory Muscles For Inspiration] : no accessory muscle use [Auscultation Breath Sounds / Voice Sounds] : lungs were clear to auscultation bilaterally [Abdomen Soft] : soft [Abdomen Tenderness] : non-tender [Abdomen Mass (___ Cm)] : no abdominal mass palpated [Nail Clubbing] : no clubbing of the fingernails [Abnormal Walk] : normal gait [Cyanosis, Localized] : no localized cyanosis [Petechial Hemorrhages (___cm)] : no petechial hemorrhages [Skin Color & Pigmentation] : normal skin color and pigmentation [] : no rash [No Venous Stasis] : no venous stasis [Skin Lesions] : no skin lesions [No Skin Ulcers] : no skin ulcer [No Xanthoma] : no  xanthoma was observed

## 2024-03-12 NOTE — ASSESSMENT
[FreeTextEntry1] : The patient has PAF  S/P AF ablation and has not had recurrence since he had the ablation  .The patient has not had chest pain or SOB . No palpiations .  . .  He has RAZIA and is using his Bibap.  Seeing Dr. sutton  LDL is not at goal but he has not been taking his Atorvastatin . Will restart

## 2024-07-26 ENCOUNTER — APPOINTMENT (OUTPATIENT)
Dept: PULMONOLOGY | Facility: CLINIC | Age: 64
End: 2024-07-26
Payer: COMMERCIAL

## 2024-07-26 VITALS
OXYGEN SATURATION: 98 % | HEIGHT: 72 IN | DIASTOLIC BLOOD PRESSURE: 80 MMHG | BODY MASS INDEX: 42.66 KG/M2 | SYSTOLIC BLOOD PRESSURE: 138 MMHG | WEIGHT: 315 LBS | HEART RATE: 56 BPM

## 2024-07-26 DIAGNOSIS — E66.9 OBESITY, UNSPECIFIED: ICD-10-CM

## 2024-07-26 DIAGNOSIS — G47.33 OBSTRUCTIVE SLEEP APNEA (ADULT) (PEDIATRIC): ICD-10-CM

## 2024-07-26 PROCEDURE — 99212 OFFICE O/P EST SF 10 MIN: CPT

## 2024-07-26 NOTE — DISCUSSION/SUMMARY
[FreeTextEntry1] : RAZIA COMPLAINT AND BENEFITING REVIEWED AND DISCUSSED DOWNLOAD WEIGHT LOSS ORDER SUPPLIES

## 2024-09-17 ENCOUNTER — APPOINTMENT (OUTPATIENT)
Dept: CARDIOLOGY | Facility: CLINIC | Age: 64
End: 2024-09-17

## 2024-12-18 ENCOUNTER — APPOINTMENT (OUTPATIENT)
Dept: CARDIOLOGY | Facility: CLINIC | Age: 64
End: 2024-12-18
Payer: COMMERCIAL

## 2024-12-18 VITALS — BODY MASS INDEX: 42.66 KG/M2 | WEIGHT: 315 LBS | HEIGHT: 72 IN

## 2024-12-18 VITALS — SYSTOLIC BLOOD PRESSURE: 128 MMHG | HEART RATE: 61 BPM | DIASTOLIC BLOOD PRESSURE: 70 MMHG

## 2024-12-18 PROCEDURE — 93000 ELECTROCARDIOGRAM COMPLETE: CPT

## 2024-12-18 PROCEDURE — 99214 OFFICE O/P EST MOD 30 MIN: CPT | Mod: 25

## 2024-12-18 RX ORDER — LEVOTHYROXINE SODIUM 0.05 MG/1
50 TABLET ORAL
Refills: 0 | Status: ACTIVE | COMMUNITY

## 2024-12-19 ENCOUNTER — NON-APPOINTMENT (OUTPATIENT)
Age: 64
End: 2024-12-19

## 2025-02-13 ENCOUNTER — APPOINTMENT (OUTPATIENT)
Dept: SURGERY | Facility: CLINIC | Age: 65
End: 2025-02-13

## 2025-02-13 VITALS
BODY MASS INDEX: 42.66 KG/M2 | HEIGHT: 72 IN | DIASTOLIC BLOOD PRESSURE: 80 MMHG | SYSTOLIC BLOOD PRESSURE: 128 MMHG | HEART RATE: 66 BPM | OXYGEN SATURATION: 99 % | WEIGHT: 315 LBS

## 2025-02-13 DIAGNOSIS — I48.0 PAROXYSMAL ATRIAL FIBRILLATION: ICD-10-CM

## 2025-02-13 DIAGNOSIS — Z83.49 FAMILY HISTORY OF OTHER ENDOCRINE, NUTRITIONAL AND METABOLIC DISEASES: ICD-10-CM

## 2025-02-13 DIAGNOSIS — E03.9 HYPOTHYROIDISM, UNSPECIFIED: ICD-10-CM

## 2025-02-13 DIAGNOSIS — G47.20 CIRCADIAN RHYTHM SLEEP DISORDER, UNSPECIFIED TYPE: ICD-10-CM

## 2025-02-13 DIAGNOSIS — Z82.49 FAMILY HISTORY OF ISCHEMIC HEART DISEASE AND OTHER DISEASES OF THE CIRCULATORY SYSTEM: ICD-10-CM

## 2025-02-13 DIAGNOSIS — Z81.8 FAMILY HISTORY OF OTHER MENTAL AND BEHAVIORAL DISORDERS: ICD-10-CM

## 2025-02-13 DIAGNOSIS — R73.03 PREDIABETES.: ICD-10-CM

## 2025-02-13 DIAGNOSIS — E88.810 METABOLIC SYNDROME: ICD-10-CM

## 2025-02-13 DIAGNOSIS — G47.33 OBSTRUCTIVE SLEEP APNEA (ADULT) (PEDIATRIC): ICD-10-CM

## 2025-02-13 DIAGNOSIS — Z80.9 FAMILY HISTORY OF MALIGNANT NEOPLASM, UNSPECIFIED: ICD-10-CM

## 2025-02-13 DIAGNOSIS — Z78.9 OTHER SPECIFIED HEALTH STATUS: ICD-10-CM

## 2025-02-13 DIAGNOSIS — Z01.818 ENCOUNTER FOR OTHER PREPROCEDURAL EXAMINATION: ICD-10-CM

## 2025-02-13 DIAGNOSIS — K43.2 INCISIONAL HERNIA W/OUT OBSTRUCTION OR GANGRENE: ICD-10-CM

## 2025-02-13 DIAGNOSIS — E78.5 HYPERLIPIDEMIA, UNSPECIFIED: ICD-10-CM

## 2025-02-13 DIAGNOSIS — E66.813 OBESITY, CLASS 3: ICD-10-CM

## 2025-02-13 PROCEDURE — 99205 OFFICE O/P NEW HI 60 MIN: CPT

## 2025-02-13 PROCEDURE — G2211 COMPLEX E/M VISIT ADD ON: CPT | Mod: NC

## 2025-02-13 RX ORDER — TIRZEPATIDE 5 MG/.5ML
5 INJECTION, SOLUTION SUBCUTANEOUS
Qty: 1 | Refills: 1 | Status: ACTIVE | COMMUNITY
Start: 2025-02-13 | End: 1900-01-01

## 2025-02-13 RX ORDER — TIRZEPATIDE 2.5 MG/.5ML
2.5 INJECTION, SOLUTION SUBCUTANEOUS
Qty: 1 | Refills: 2 | Status: ACTIVE | COMMUNITY
Start: 2025-02-13 | End: 1900-01-01

## 2025-02-14 ENCOUNTER — NON-APPOINTMENT (OUTPATIENT)
Age: 65
End: 2025-02-14

## 2025-02-19 PROBLEM — K43.2 RECURRENT VENTRAL HERNIA: Status: ACTIVE | Noted: 2025-02-19

## 2025-02-19 PROBLEM — G47.20 DISTURBED SLEEP RHYTHM: Status: ACTIVE | Noted: 2025-02-19

## 2025-02-19 PROBLEM — Z82.49 FAMILY HISTORY OF ATRIAL FIBRILLATION: Status: ACTIVE | Noted: 2025-02-19

## 2025-02-19 PROBLEM — Z81.8 FAMILY HISTORY OF DEMENTIA: Status: ACTIVE | Noted: 2025-02-19

## 2025-02-19 PROBLEM — Z83.49 FAMILY HISTORY OF OBESITY: Status: ACTIVE | Noted: 2025-02-19

## 2025-02-19 PROBLEM — E78.5 HYPERLIPIDEMIA: Status: ACTIVE | Noted: 2025-02-19

## 2025-02-19 PROBLEM — Z82.49 FAMILY HISTORY OF CARDIAC DISORDER: Status: ACTIVE | Noted: 2025-02-19

## 2025-02-19 PROBLEM — Z78.9 SOCIAL ALCOHOL USE: Status: ACTIVE | Noted: 2025-02-19

## 2025-02-19 PROBLEM — R73.03 PREDIABETES: Status: ACTIVE | Noted: 2025-02-19

## 2025-02-19 PROBLEM — E03.9 HYPOTHYROIDISM: Status: ACTIVE | Noted: 2025-02-19

## 2025-02-19 PROBLEM — E88.810 METABOLIC SYNDROME: Status: ACTIVE | Noted: 2025-02-19

## 2025-02-19 PROBLEM — Z01.818 PRE-OP TESTING: Status: RESOLVED | Noted: 2020-09-26 | Resolved: 2025-02-19

## 2025-02-19 PROBLEM — Z80.9 FAMILY HISTORY OF MALIGNANT NEOPLASM: Status: ACTIVE | Noted: 2025-02-19

## 2025-03-17 ENCOUNTER — APPOINTMENT (OUTPATIENT)
Dept: SLEEP CENTER | Facility: HOSPITAL | Age: 65
End: 2025-03-17
Payer: COMMERCIAL

## 2025-03-17 ENCOUNTER — OUTPATIENT (OUTPATIENT)
Dept: OUTPATIENT SERVICES | Facility: HOSPITAL | Age: 65
LOS: 1 days | Discharge: HOME | End: 2025-03-17
Payer: COMMERCIAL

## 2025-03-17 DIAGNOSIS — G47.33 OBSTRUCTIVE SLEEP APNEA (ADULT) (PEDIATRIC): ICD-10-CM

## 2025-03-17 PROCEDURE — 95811 POLYSOM 6/>YRS CPAP 4/> PARM: CPT | Mod: 26

## 2025-03-17 PROCEDURE — 95811 POLYSOM 6/>YRS CPAP 4/> PARM: CPT

## 2025-03-19 DIAGNOSIS — G47.33 OBSTRUCTIVE SLEEP APNEA (ADULT) (PEDIATRIC): ICD-10-CM

## 2025-03-20 ENCOUNTER — APPOINTMENT (OUTPATIENT)
Dept: SURGERY | Facility: CLINIC | Age: 65
End: 2025-03-20
Payer: COMMERCIAL

## 2025-03-20 ENCOUNTER — APPOINTMENT (OUTPATIENT)
Dept: SURGERY | Facility: CLINIC | Age: 65
End: 2025-03-20

## 2025-03-20 PROCEDURE — 97802 MEDICAL NUTRITION INDIV IN: CPT | Mod: 95

## 2025-03-21 VITALS — WEIGHT: 315 LBS | BODY MASS INDEX: 42.66 KG/M2 | HEIGHT: 72 IN

## 2025-04-03 ENCOUNTER — APPOINTMENT (OUTPATIENT)
Dept: PULMONOLOGY | Facility: CLINIC | Age: 65
End: 2025-04-03
Payer: COMMERCIAL

## 2025-04-03 DIAGNOSIS — E66.9 OBESITY, UNSPECIFIED: ICD-10-CM

## 2025-04-03 PROCEDURE — G2211 COMPLEX E/M VISIT ADD ON: CPT | Mod: NC,95

## 2025-04-03 PROCEDURE — 99212 OFFICE O/P EST SF 10 MIN: CPT | Mod: 95

## 2025-04-09 ENCOUNTER — APPOINTMENT (OUTPATIENT)
Dept: SURGERY | Facility: CLINIC | Age: 65
End: 2025-04-09

## 2025-04-09 VITALS
BODY MASS INDEX: 42.66 KG/M2 | OXYGEN SATURATION: 96 % | HEART RATE: 63 BPM | HEIGHT: 72 IN | DIASTOLIC BLOOD PRESSURE: 72 MMHG | WEIGHT: 315 LBS | SYSTOLIC BLOOD PRESSURE: 124 MMHG

## 2025-04-09 DIAGNOSIS — G47.20 CIRCADIAN RHYTHM SLEEP DISORDER, UNSPECIFIED TYPE: ICD-10-CM

## 2025-04-09 DIAGNOSIS — E88.810 METABOLIC SYNDROME: ICD-10-CM

## 2025-04-09 DIAGNOSIS — E66.813 OBESITY, CLASS 3: ICD-10-CM

## 2025-04-09 DIAGNOSIS — I48.0 PAROXYSMAL ATRIAL FIBRILLATION: ICD-10-CM

## 2025-04-09 DIAGNOSIS — K43.2 INCISIONAL HERNIA W/OUT OBSTRUCTION OR GANGRENE: ICD-10-CM

## 2025-04-09 DIAGNOSIS — R73.03 PREDIABETES.: ICD-10-CM

## 2025-04-09 DIAGNOSIS — G47.33 OBSTRUCTIVE SLEEP APNEA (ADULT) (PEDIATRIC): ICD-10-CM

## 2025-04-09 DIAGNOSIS — E11.9 TYPE 2 DIABETES MELLITUS W/OUT COMPLICATIONS: ICD-10-CM

## 2025-04-09 DIAGNOSIS — E03.9 HYPOTHYROIDISM, UNSPECIFIED: ICD-10-CM

## 2025-04-09 DIAGNOSIS — E78.5 HYPERLIPIDEMIA, UNSPECIFIED: ICD-10-CM

## 2025-04-09 PROCEDURE — 99214 OFFICE O/P EST MOD 30 MIN: CPT

## 2025-04-09 PROCEDURE — G2211 COMPLEX E/M VISIT ADD ON: CPT | Mod: NC

## 2025-04-09 RX ORDER — VALSARTAN 320 MG/1
320 TABLET, COATED ORAL
Refills: 0 | Status: ACTIVE | COMMUNITY

## 2025-04-09 RX ORDER — METFORMIN ER 500 MG 500 MG/1
500 TABLET ORAL
Qty: 60 | Refills: 2 | Status: ACTIVE | COMMUNITY
Start: 2025-04-09 | End: 1900-01-01

## 2025-04-11 ENCOUNTER — NON-APPOINTMENT (OUTPATIENT)
Age: 65
End: 2025-04-11

## 2025-04-15 PROBLEM — E11.9 TYPE 2 DIABETES MELLITUS: Status: ACTIVE | Noted: 2025-04-15

## 2025-04-15 RX ORDER — TIRZEPATIDE 5 MG/.5ML
5 INJECTION, SOLUTION SUBCUTANEOUS
Qty: 1 | Refills: 2 | Status: ACTIVE | COMMUNITY
Start: 2025-04-15 | End: 1900-01-01

## 2025-04-15 RX ORDER — TIRZEPATIDE 2.5 MG/.5ML
2.5 INJECTION, SOLUTION SUBCUTANEOUS
Qty: 1 | Refills: 0 | Status: ACTIVE | COMMUNITY
Start: 2025-04-15 | End: 1900-01-01

## 2025-04-17 ENCOUNTER — NON-APPOINTMENT (OUTPATIENT)
Age: 65
End: 2025-04-17

## 2025-05-08 ENCOUNTER — APPOINTMENT (OUTPATIENT)
Dept: SURGERY | Facility: CLINIC | Age: 65
End: 2025-05-08

## 2025-06-04 ENCOUNTER — NON-APPOINTMENT (OUTPATIENT)
Age: 65
End: 2025-06-04

## 2025-06-10 ENCOUNTER — APPOINTMENT (OUTPATIENT)
Dept: SURGERY | Facility: CLINIC | Age: 65
End: 2025-06-10

## 2025-07-25 ENCOUNTER — NON-APPOINTMENT (OUTPATIENT)
Age: 65
End: 2025-07-25

## 2025-07-28 ENCOUNTER — APPOINTMENT (OUTPATIENT)
Dept: PULMONOLOGY | Facility: CLINIC | Age: 65
End: 2025-07-28